# Patient Record
Sex: FEMALE | Race: WHITE | Employment: UNEMPLOYED | URBAN - METROPOLITAN AREA
[De-identification: names, ages, dates, MRNs, and addresses within clinical notes are randomized per-mention and may not be internally consistent; named-entity substitution may affect disease eponyms.]

---

## 2017-01-11 ENCOUNTER — TELEPHONE (OUTPATIENT)
Dept: FAMILY MEDICINE CLINIC | Age: 37
End: 2017-01-11

## 2017-02-27 ENCOUNTER — OFFICE VISIT (OUTPATIENT)
Dept: FAMILY MEDICINE CLINIC | Age: 37
End: 2017-02-27

## 2017-02-27 VITALS
WEIGHT: 178 LBS | BODY MASS INDEX: 27.94 KG/M2 | OXYGEN SATURATION: 99 % | HEIGHT: 67 IN | DIASTOLIC BLOOD PRESSURE: 62 MMHG | SYSTOLIC BLOOD PRESSURE: 104 MMHG | TEMPERATURE: 98.4 F | HEART RATE: 59 BPM

## 2017-02-27 DIAGNOSIS — E03.9 HYPOTHYROIDISM, UNSPECIFIED TYPE: ICD-10-CM

## 2017-02-27 DIAGNOSIS — E53.8 B12 DEFICIENCY: ICD-10-CM

## 2017-02-27 DIAGNOSIS — Z00.00 HEALTHY ADULT ON ROUTINE PHYSICAL EXAMINATION: Primary | ICD-10-CM

## 2017-02-27 DIAGNOSIS — E55.9 VITAMIN D DEFICIENCY: ICD-10-CM

## 2017-02-27 LAB
A/G RATIO: 1.3 (ref 1.1–2.2)
ALBUMIN SERPL-MCNC: 4 G/DL (ref 3.4–5)
ALP BLD-CCNC: 53 U/L (ref 40–129)
ALT SERPL-CCNC: 34 U/L (ref 10–40)
ANION GAP SERPL CALCULATED.3IONS-SCNC: 16 MMOL/L (ref 3–16)
AST SERPL-CCNC: 18 U/L (ref 15–37)
BASOPHILS ABSOLUTE: 0 K/UL (ref 0–0.2)
BASOPHILS RELATIVE PERCENT: 0.6 %
BILIRUB SERPL-MCNC: 0.4 MG/DL (ref 0–1)
BUN BLDV-MCNC: 10 MG/DL (ref 7–20)
CALCIUM SERPL-MCNC: 9 MG/DL (ref 8.3–10.6)
CHLORIDE BLD-SCNC: 102 MMOL/L (ref 99–110)
CHOLESTEROL, TOTAL: 171 MG/DL (ref 0–199)
CO2: 22 MMOL/L (ref 21–32)
CREAT SERPL-MCNC: 1 MG/DL (ref 0.6–1.1)
EOSINOPHILS ABSOLUTE: 0.1 K/UL (ref 0–0.6)
EOSINOPHILS RELATIVE PERCENT: 2.4 %
FOLATE: >20 NG/ML (ref 4.78–24.2)
GFR AFRICAN AMERICAN: >60
GFR NON-AFRICAN AMERICAN: >60
GLOBULIN: 3 G/DL
GLUCOSE BLD-MCNC: 88 MG/DL (ref 70–99)
HCT VFR BLD CALC: 42.2 % (ref 36–48)
HDLC SERPL-MCNC: 55 MG/DL (ref 40–60)
HEMOGLOBIN: 14.1 G/DL (ref 12–16)
LDL CHOLESTEROL CALCULATED: 98 MG/DL
LYMPHOCYTES ABSOLUTE: 1.8 K/UL (ref 1–5.1)
LYMPHOCYTES RELATIVE PERCENT: 29.9 %
MCH RBC QN AUTO: 30.4 PG (ref 26–34)
MCHC RBC AUTO-ENTMCNC: 33.4 G/DL (ref 31–36)
MCV RBC AUTO: 91.2 FL (ref 80–100)
MONOCYTES ABSOLUTE: 0.3 K/UL (ref 0–1.3)
MONOCYTES RELATIVE PERCENT: 5.5 %
NEUTROPHILS ABSOLUTE: 3.6 K/UL (ref 1.7–7.7)
NEUTROPHILS RELATIVE PERCENT: 61.6 %
PDW BLD-RTO: 12.8 % (ref 12.4–15.4)
PLATELET # BLD: 256 K/UL (ref 135–450)
PMV BLD AUTO: 9.5 FL (ref 5–10.5)
POTASSIUM SERPL-SCNC: 4.3 MMOL/L (ref 3.5–5.1)
RBC # BLD: 4.63 M/UL (ref 4–5.2)
SODIUM BLD-SCNC: 140 MMOL/L (ref 136–145)
T3 FREE: 2.9 PG/ML (ref 2.3–4.2)
T4 FREE: 1.6 NG/DL (ref 0.9–1.8)
TOTAL PROTEIN: 7 G/DL (ref 6.4–8.2)
TRIGL SERPL-MCNC: 90 MG/DL (ref 0–150)
TSH SERPL DL<=0.05 MIU/L-ACNC: 0.75 UIU/ML (ref 0.27–4.2)
VITAMIN B-12: 317 PG/ML (ref 211–911)
VITAMIN D 25-HYDROXY: 44.1 NG/ML
VLDLC SERPL CALC-MCNC: 18 MG/DL
WBC # BLD: 5.9 K/UL (ref 4–11)

## 2017-02-27 PROCEDURE — 99395 PREV VISIT EST AGE 18-39: CPT | Performed by: FAMILY MEDICINE

## 2017-02-27 PROCEDURE — 36415 COLL VENOUS BLD VENIPUNCTURE: CPT | Performed by: FAMILY MEDICINE

## 2017-02-27 RX ORDER — LEVOTHYROXINE SODIUM 0.1 MG/1
100 TABLET ORAL DAILY
Qty: 30 TABLET | Refills: 3 | Status: SHIPPED | OUTPATIENT
Start: 2017-02-27 | End: 2017-07-15 | Stop reason: SDUPTHER

## 2017-03-06 ENCOUNTER — OFFICE VISIT (OUTPATIENT)
Dept: OBGYN CLINIC | Age: 37
End: 2017-03-06

## 2017-03-06 VITALS
BODY MASS INDEX: 28.41 KG/M2 | DIASTOLIC BLOOD PRESSURE: 74 MMHG | HEART RATE: 76 BPM | HEIGHT: 67 IN | TEMPERATURE: 99.9 F | SYSTOLIC BLOOD PRESSURE: 110 MMHG | WEIGHT: 181 LBS

## 2017-03-06 DIAGNOSIS — Z12.4 PAP SMEAR FOR CERVICAL CANCER SCREENING: ICD-10-CM

## 2017-03-06 DIAGNOSIS — Z87.42 HISTORY OF VAGINITIS: ICD-10-CM

## 2017-03-06 DIAGNOSIS — B00.9 HSV-2 INFECTION: ICD-10-CM

## 2017-03-06 DIAGNOSIS — Z01.419 WOMEN'S ANNUAL ROUTINE GYNECOLOGICAL EXAMINATION: Primary | ICD-10-CM

## 2017-03-06 DIAGNOSIS — N94.10 DYSPAREUNIA, FEMALE: ICD-10-CM

## 2017-03-06 PROCEDURE — 87210 SMEAR WET MOUNT SALINE/INK: CPT | Performed by: OBSTETRICS & GYNECOLOGY

## 2017-03-06 PROCEDURE — 99395 PREV VISIT EST AGE 18-39: CPT | Performed by: OBSTETRICS & GYNECOLOGY

## 2017-03-06 ASSESSMENT — ENCOUNTER SYMPTOMS
NAUSEA: 0
DIARRHEA: 0
CONSTIPATION: 0
SHORTNESS OF BREATH: 0
ABDOMINAL PAIN: 0
VOMITING: 0

## 2017-03-07 DIAGNOSIS — N76.0 ACUTE VAGINITIS: Primary | ICD-10-CM

## 2017-03-31 ENCOUNTER — TELEPHONE (OUTPATIENT)
Dept: FAMILY MEDICINE CLINIC | Age: 37
End: 2017-03-31

## 2017-04-04 ENCOUNTER — OFFICE VISIT (OUTPATIENT)
Dept: FAMILY MEDICINE CLINIC | Age: 37
End: 2017-04-04

## 2017-04-04 VITALS
WEIGHT: 176 LBS | SYSTOLIC BLOOD PRESSURE: 124 MMHG | HEART RATE: 68 BPM | DIASTOLIC BLOOD PRESSURE: 76 MMHG | OXYGEN SATURATION: 98 % | TEMPERATURE: 98.5 F | RESPIRATION RATE: 14 BRPM | BODY MASS INDEX: 27.57 KG/M2

## 2017-04-04 DIAGNOSIS — R09.89 CHEST CONGESTION: Primary | ICD-10-CM

## 2017-04-04 PROCEDURE — 99213 OFFICE O/P EST LOW 20 MIN: CPT | Performed by: NURSE PRACTITIONER

## 2017-04-04 RX ORDER — ALBUTEROL SULFATE 90 UG/1
2 AEROSOL, METERED RESPIRATORY (INHALATION) EVERY 4 HOURS PRN
Qty: 1 INHALER | Refills: 0 | Status: SHIPPED | OUTPATIENT
Start: 2017-04-04 | End: 2017-04-18 | Stop reason: SDUPTHER

## 2017-04-04 RX ORDER — AZITHROMYCIN 250 MG/1
TABLET, FILM COATED ORAL
Qty: 1 PACKET | Refills: 0 | Status: SHIPPED | OUTPATIENT
Start: 2017-04-04 | End: 2017-04-10 | Stop reason: ALTCHOICE

## 2017-04-04 ASSESSMENT — ENCOUNTER SYMPTOMS
SORE THROAT: 0
COUGH: 0
HEARTBURN: 0
SHORTNESS OF BREATH: 1
SPUTUM PRODUCTION: 0

## 2017-04-10 ENCOUNTER — OFFICE VISIT (OUTPATIENT)
Dept: FAMILY MEDICINE CLINIC | Age: 37
End: 2017-04-10

## 2017-04-10 VITALS
TEMPERATURE: 98.4 F | WEIGHT: 178 LBS | DIASTOLIC BLOOD PRESSURE: 64 MMHG | SYSTOLIC BLOOD PRESSURE: 104 MMHG | BODY MASS INDEX: 27.88 KG/M2 | HEART RATE: 68 BPM | OXYGEN SATURATION: 98 %

## 2017-04-10 DIAGNOSIS — J30.9 ALLERGIC RHINITIS, UNSPECIFIED ALLERGIC RHINITIS TRIGGER, UNSPECIFIED RHINITIS SEASONALITY: ICD-10-CM

## 2017-04-10 DIAGNOSIS — R07.9 CHEST PAIN, UNSPECIFIED TYPE: Primary | ICD-10-CM

## 2017-04-10 PROCEDURE — 93000 ELECTROCARDIOGRAM COMPLETE: CPT | Performed by: NURSE PRACTITIONER

## 2017-04-10 PROCEDURE — 99213 OFFICE O/P EST LOW 20 MIN: CPT | Performed by: NURSE PRACTITIONER

## 2017-04-10 ASSESSMENT — ENCOUNTER SYMPTOMS
SORE THROAT: 0
WHEEZING: 0
SPUTUM PRODUCTION: 0
COUGH: 0

## 2017-04-18 DIAGNOSIS — R09.89 CHEST CONGESTION: ICD-10-CM

## 2017-07-15 DIAGNOSIS — E03.9 HYPOTHYROIDISM, UNSPECIFIED TYPE: ICD-10-CM

## 2017-07-17 RX ORDER — LEVOTHYROXINE SODIUM 0.1 MG/1
TABLET ORAL
Qty: 30 TABLET | Refills: 3 | Status: SHIPPED | OUTPATIENT
Start: 2017-07-17 | End: 2017-11-14 | Stop reason: SDUPTHER

## 2017-09-26 RX ORDER — ACYCLOVIR 800 MG/1
800 TABLET ORAL 2 TIMES DAILY
Qty: 10 TABLET | Refills: 3 | Status: SHIPPED | OUTPATIENT
Start: 2017-09-26 | End: 2017-10-01

## 2017-11-14 DIAGNOSIS — E03.9 HYPOTHYROIDISM, UNSPECIFIED TYPE: ICD-10-CM

## 2017-11-14 RX ORDER — LEVOTHYROXINE SODIUM 0.1 MG/1
TABLET ORAL
Qty: 30 TABLET | Refills: 0 | Status: SHIPPED | OUTPATIENT
Start: 2017-11-14 | End: 2017-12-17 | Stop reason: SDUPTHER

## 2018-03-15 RX ORDER — NORETHINDRONE AND ETHINYL ESTRADIOL 1 MG-35MCG
KIT ORAL
Qty: 28 TABLET | Refills: 0 | Status: SHIPPED | OUTPATIENT
Start: 2018-03-15 | End: 2018-04-12 | Stop reason: SDUPTHER

## 2018-04-12 RX ORDER — NORETHINDRONE AND ETHINYL ESTRADIOL 1 MG-35MCG
KIT ORAL
Qty: 28 TABLET | Refills: 0 | Status: SHIPPED | OUTPATIENT
Start: 2018-04-12 | End: 2018-04-25 | Stop reason: SDUPTHER

## 2018-04-17 ENCOUNTER — TELEPHONE (OUTPATIENT)
Dept: FAMILY MEDICINE CLINIC | Age: 38
End: 2018-04-17

## 2018-04-23 ENCOUNTER — OFFICE VISIT (OUTPATIENT)
Dept: OBGYN CLINIC | Age: 38
End: 2018-04-23

## 2018-04-23 ENCOUNTER — OFFICE VISIT (OUTPATIENT)
Dept: FAMILY MEDICINE CLINIC | Age: 38
End: 2018-04-23

## 2018-04-23 VITALS
SYSTOLIC BLOOD PRESSURE: 108 MMHG | BODY MASS INDEX: 25.87 KG/M2 | HEIGHT: 67 IN | DIASTOLIC BLOOD PRESSURE: 76 MMHG | HEART RATE: 74 BPM | OXYGEN SATURATION: 99 % | RESPIRATION RATE: 16 BRPM | WEIGHT: 164.8 LBS

## 2018-04-23 VITALS
BODY MASS INDEX: 25.94 KG/M2 | WEIGHT: 165.6 LBS | HEART RATE: 64 BPM | SYSTOLIC BLOOD PRESSURE: 94 MMHG | DIASTOLIC BLOOD PRESSURE: 60 MMHG | TEMPERATURE: 98.8 F

## 2018-04-23 DIAGNOSIS — Z00.00 HEALTHY ADULT ON ROUTINE PHYSICAL EXAMINATION: Primary | ICD-10-CM

## 2018-04-23 DIAGNOSIS — N63.10 BREAST MASS, RIGHT: ICD-10-CM

## 2018-04-23 DIAGNOSIS — Z01.419 WOMEN'S ANNUAL ROUTINE GYNECOLOGICAL EXAMINATION: Primary | ICD-10-CM

## 2018-04-23 DIAGNOSIS — Z12.4 PAP SMEAR FOR CERVICAL CANCER SCREENING: ICD-10-CM

## 2018-04-23 DIAGNOSIS — Z12.39 BREAST CANCER SCREENING: ICD-10-CM

## 2018-04-23 LAB
A/G RATIO: 1.6 (ref 1.1–2.2)
ALBUMIN SERPL-MCNC: 4.4 G/DL (ref 3.4–5)
ALP BLD-CCNC: 40 U/L (ref 40–129)
ALT SERPL-CCNC: 18 U/L (ref 10–40)
ANION GAP SERPL CALCULATED.3IONS-SCNC: 17 MMOL/L (ref 3–16)
AST SERPL-CCNC: 17 U/L (ref 15–37)
BILIRUB SERPL-MCNC: 0.5 MG/DL (ref 0–1)
BUN BLDV-MCNC: 9 MG/DL (ref 7–20)
CALCIUM SERPL-MCNC: 9 MG/DL (ref 8.3–10.6)
CHLORIDE BLD-SCNC: 99 MMOL/L (ref 99–110)
CHOLESTEROL, TOTAL: 181 MG/DL (ref 0–199)
CO2: 24 MMOL/L (ref 21–32)
CREAT SERPL-MCNC: 0.8 MG/DL (ref 0.6–1.1)
GFR AFRICAN AMERICAN: >60
GFR NON-AFRICAN AMERICAN: >60
GLOBULIN: 2.7 G/DL
GLUCOSE BLD-MCNC: 87 MG/DL (ref 70–99)
HDLC SERPL-MCNC: 77 MG/DL (ref 40–60)
LDL CHOLESTEROL CALCULATED: 90 MG/DL
POTASSIUM SERPL-SCNC: 4 MMOL/L (ref 3.5–5.1)
SODIUM BLD-SCNC: 140 MMOL/L (ref 136–145)
T4 FREE: 1.5 NG/DL (ref 0.9–1.8)
TOTAL PROTEIN: 7.1 G/DL (ref 6.4–8.2)
TRIGL SERPL-MCNC: 72 MG/DL (ref 0–150)
TSH SERPL DL<=0.05 MIU/L-ACNC: 2.4 UIU/ML (ref 0.27–4.2)
VLDLC SERPL CALC-MCNC: 14 MG/DL

## 2018-04-23 PROCEDURE — 99395 PREV VISIT EST AGE 18-39: CPT | Performed by: OBSTETRICS & GYNECOLOGY

## 2018-04-23 PROCEDURE — 99395 PREV VISIT EST AGE 18-39: CPT | Performed by: FAMILY MEDICINE

## 2018-04-23 PROCEDURE — 36415 COLL VENOUS BLD VENIPUNCTURE: CPT | Performed by: FAMILY MEDICINE

## 2018-04-23 ASSESSMENT — PATIENT HEALTH QUESTIONNAIRE - PHQ9
SUM OF ALL RESPONSES TO PHQ QUESTIONS 1-9: 0
SUM OF ALL RESPONSES TO PHQ9 QUESTIONS 1 & 2: 0
1. LITTLE INTEREST OR PLEASURE IN DOING THINGS: 0
2. FEELING DOWN, DEPRESSED OR HOPELESS: 0

## 2018-04-24 ENCOUNTER — TELEPHONE (OUTPATIENT)
Dept: OBGYN CLINIC | Age: 38
End: 2018-04-24

## 2018-04-24 ENCOUNTER — HOSPITAL ENCOUNTER (OUTPATIENT)
Dept: MAMMOGRAPHY | Age: 38
Discharge: HOME | End: 2018-04-24
Attending: OBSTETRICS & GYNECOLOGY | Admitting: OBSTETRICS & GYNECOLOGY

## 2018-04-24 DIAGNOSIS — Z01.419 WOMEN'S ANNUAL ROUTINE GYNECOLOGICAL EXAMINATION: ICD-10-CM

## 2018-04-24 DIAGNOSIS — R92.8 ABNORMAL MAMMOGRAM OF RIGHT BREAST: Primary | ICD-10-CM

## 2018-04-24 DIAGNOSIS — Z12.39 BREAST CANCER SCREENING: ICD-10-CM

## 2018-04-24 LAB
ESTIMATED AVERAGE GLUCOSE: 105.4 MG/DL
HBA1C MFR BLD: 5.3 %

## 2018-04-25 LAB
HPV COMMENT: NORMAL
HPV TYPE 16: NOT DETECTED
HPV TYPE 18: NOT DETECTED
HPVOH (OTHER TYPES): NOT DETECTED

## 2018-04-25 RX ORDER — NORETHINDRONE AND ETHINYL ESTRADIOL 1 MG-35MCG
KIT ORAL
Qty: 84 TABLET | Refills: 3 | Status: SHIPPED | OUTPATIENT
Start: 2018-04-25 | End: 2019-02-15 | Stop reason: ALTCHOICE

## 2018-05-11 ENCOUNTER — HOSPITAL ENCOUNTER (OUTPATIENT)
Dept: MAMMOGRAPHY | Age: 38
Discharge: HOME | End: 2018-05-11
Admitting: OBSTETRICS & GYNECOLOGY

## 2018-05-11 DIAGNOSIS — R92.8 ABNORMAL MAMMOGRAM: ICD-10-CM

## 2018-07-26 DIAGNOSIS — E03.9 HYPOTHYROIDISM, UNSPECIFIED TYPE: ICD-10-CM

## 2018-07-26 RX ORDER — LEVOTHYROXINE SODIUM 0.1 MG/1
TABLET ORAL
Qty: 30 TABLET | Refills: 0 | Status: SHIPPED | OUTPATIENT
Start: 2018-07-26 | End: 2018-09-01 | Stop reason: SDUPTHER

## 2019-01-11 ENCOUNTER — TELEPHONE (OUTPATIENT)
Dept: FAMILY MEDICINE CLINIC | Age: 39
End: 2019-01-11

## 2019-02-15 ENCOUNTER — OFFICE VISIT (OUTPATIENT)
Dept: FAMILY MEDICINE CLINIC | Age: 39
End: 2019-02-15
Payer: COMMERCIAL

## 2019-02-15 VITALS
SYSTOLIC BLOOD PRESSURE: 100 MMHG | HEIGHT: 67 IN | DIASTOLIC BLOOD PRESSURE: 68 MMHG | OXYGEN SATURATION: 98 % | HEART RATE: 74 BPM | RESPIRATION RATE: 16 BRPM | BODY MASS INDEX: 27.31 KG/M2 | WEIGHT: 174 LBS

## 2019-02-15 DIAGNOSIS — Z00.00 PHYSICAL EXAM: Primary | ICD-10-CM

## 2019-02-15 LAB
A/G RATIO: 1.7 (ref 1.1–2.2)
ALBUMIN SERPL-MCNC: 4.5 G/DL (ref 3.4–5)
ALP BLD-CCNC: 50 U/L (ref 40–129)
ALT SERPL-CCNC: 14 U/L (ref 10–40)
ANION GAP SERPL CALCULATED.3IONS-SCNC: 12 MMOL/L (ref 3–16)
AST SERPL-CCNC: 14 U/L (ref 15–37)
BASOPHILS ABSOLUTE: 0 K/UL (ref 0–0.2)
BASOPHILS RELATIVE PERCENT: 0.6 %
BILIRUB SERPL-MCNC: 0.3 MG/DL (ref 0–1)
BUN BLDV-MCNC: 16 MG/DL (ref 7–20)
CALCIUM SERPL-MCNC: 9.5 MG/DL (ref 8.3–10.6)
CHLORIDE BLD-SCNC: 102 MMOL/L (ref 99–110)
CHOLESTEROL, TOTAL: 175 MG/DL (ref 0–199)
CO2: 26 MMOL/L (ref 21–32)
CREAT SERPL-MCNC: 0.9 MG/DL (ref 0.6–1.1)
EOSINOPHILS ABSOLUTE: 0.1 K/UL (ref 0–0.6)
EOSINOPHILS RELATIVE PERCENT: 2 %
GFR AFRICAN AMERICAN: >60
GFR NON-AFRICAN AMERICAN: >60
GLOBULIN: 2.6 G/DL
GLUCOSE BLD-MCNC: 90 MG/DL (ref 70–99)
HCT VFR BLD CALC: 41.5 % (ref 36–48)
HDLC SERPL-MCNC: 81 MG/DL (ref 40–60)
HEMOGLOBIN: 14 G/DL (ref 12–16)
LDL CHOLESTEROL CALCULATED: 78 MG/DL
LYMPHOCYTES ABSOLUTE: 1.7 K/UL (ref 1–5.1)
LYMPHOCYTES RELATIVE PERCENT: 25.8 %
MCH RBC QN AUTO: 31.6 PG (ref 26–34)
MCHC RBC AUTO-ENTMCNC: 33.8 G/DL (ref 31–36)
MCV RBC AUTO: 93.7 FL (ref 80–100)
MONOCYTES ABSOLUTE: 0.4 K/UL (ref 0–1.3)
MONOCYTES RELATIVE PERCENT: 5.8 %
NEUTROPHILS ABSOLUTE: 4.2 K/UL (ref 1.7–7.7)
NEUTROPHILS RELATIVE PERCENT: 65.8 %
PDW BLD-RTO: 12.7 % (ref 12.4–15.4)
PLATELET # BLD: 283 K/UL (ref 135–450)
PMV BLD AUTO: 8.4 FL (ref 5–10.5)
POTASSIUM SERPL-SCNC: 4.6 MMOL/L (ref 3.5–5.1)
RBC # BLD: 4.42 M/UL (ref 4–5.2)
SODIUM BLD-SCNC: 140 MMOL/L (ref 136–145)
T4 FREE: 1.4 NG/DL (ref 0.9–1.8)
TOTAL PROTEIN: 7.1 G/DL (ref 6.4–8.2)
TRIGL SERPL-MCNC: 79 MG/DL (ref 0–150)
TSH SERPL DL<=0.05 MIU/L-ACNC: 2.01 UIU/ML (ref 0.27–4.2)
VLDLC SERPL CALC-MCNC: 16 MG/DL
WBC # BLD: 6.4 K/UL (ref 4–11)

## 2019-02-15 PROCEDURE — 99395 PREV VISIT EST AGE 18-39: CPT | Performed by: NURSE PRACTITIONER

## 2019-02-15 PROCEDURE — 36415 COLL VENOUS BLD VENIPUNCTURE: CPT | Performed by: NURSE PRACTITIONER

## 2019-02-15 ASSESSMENT — PATIENT HEALTH QUESTIONNAIRE - PHQ9
SUM OF ALL RESPONSES TO PHQ QUESTIONS 1-9: 0
SUM OF ALL RESPONSES TO PHQ9 QUESTIONS 1 & 2: 0
2. FEELING DOWN, DEPRESSED OR HOPELESS: 0
SUM OF ALL RESPONSES TO PHQ9 QUESTIONS 1 & 2: 0
1. LITTLE INTEREST OR PLEASURE IN DOING THINGS: 0
SUM OF ALL RESPONSES TO PHQ QUESTIONS 1-9: 0
2. FEELING DOWN, DEPRESSED OR HOPELESS: 0
1. LITTLE INTEREST OR PLEASURE IN DOING THINGS: 0
SUM OF ALL RESPONSES TO PHQ QUESTIONS 1-9: 0
SUM OF ALL RESPONSES TO PHQ QUESTIONS 1-9: 0

## 2019-02-15 ASSESSMENT — ENCOUNTER SYMPTOMS
BACK PAIN: 0
SHORTNESS OF BREATH: 0
WHEEZING: 0
SINUS PRESSURE: 0
EYE PAIN: 0
CONSTIPATION: 0
ABDOMINAL PAIN: 0
DIARRHEA: 0
SINUS PAIN: 0
SORE THROAT: 0

## 2019-02-16 LAB
ESTIMATED AVERAGE GLUCOSE: 99.7 MG/DL
HBA1C MFR BLD: 5.1 %

## 2019-02-25 ENCOUNTER — OFFICE VISIT (OUTPATIENT)
Dept: FAMILY MEDICINE CLINIC | Age: 39
End: 2019-02-25
Payer: COMMERCIAL

## 2019-02-25 VITALS
WEIGHT: 174 LBS | SYSTOLIC BLOOD PRESSURE: 118 MMHG | TEMPERATURE: 98.9 F | HEART RATE: 84 BPM | DIASTOLIC BLOOD PRESSURE: 64 MMHG | BODY MASS INDEX: 27.25 KG/M2

## 2019-02-25 DIAGNOSIS — B02.9 HERPES ZOSTER WITHOUT COMPLICATION: Primary | ICD-10-CM

## 2019-02-25 PROCEDURE — 99213 OFFICE O/P EST LOW 20 MIN: CPT | Performed by: NURSE PRACTITIONER

## 2019-02-25 RX ORDER — ACYCLOVIR 800 MG/1
800 TABLET ORAL
Qty: 35 TABLET | Refills: 0 | Status: SHIPPED | OUTPATIENT
Start: 2019-02-25 | End: 2019-03-04

## 2019-02-25 RX ORDER — GABAPENTIN 100 MG/1
100 CAPSULE ORAL 4 TIMES DAILY
Qty: 120 CAPSULE | Refills: 0 | Status: SHIPPED | OUTPATIENT
Start: 2019-02-25 | End: 2019-06-25

## 2019-02-25 ASSESSMENT — ENCOUNTER SYMPTOMS
RHINORRHEA: 0
DIARRHEA: 0
COUGH: 0
SHORTNESS OF BREATH: 0

## 2019-03-12 DIAGNOSIS — E03.9 HYPOTHYROIDISM, UNSPECIFIED TYPE: ICD-10-CM

## 2019-03-12 RX ORDER — LEVOTHYROXINE SODIUM 0.1 MG/1
TABLET ORAL
Qty: 30 TABLET | Refills: 4 | Status: SHIPPED | OUTPATIENT
Start: 2019-03-12 | End: 2019-08-16 | Stop reason: SDUPTHER

## 2019-06-25 ENCOUNTER — OFFICE VISIT (OUTPATIENT)
Dept: OBGYN CLINIC | Age: 39
End: 2019-06-25
Payer: COMMERCIAL

## 2019-06-25 VITALS
HEART RATE: 80 BPM | BODY MASS INDEX: 27.06 KG/M2 | TEMPERATURE: 97.4 F | SYSTOLIC BLOOD PRESSURE: 128 MMHG | DIASTOLIC BLOOD PRESSURE: 84 MMHG | WEIGHT: 172.8 LBS

## 2019-06-25 DIAGNOSIS — Z87.42 HISTORY OF VAGINITIS: ICD-10-CM

## 2019-06-25 DIAGNOSIS — Z86.19 HISTORY OF HPV INFECTION: ICD-10-CM

## 2019-06-25 DIAGNOSIS — N92.6 IRREGULAR MENSTRUAL BLEEDING: ICD-10-CM

## 2019-06-25 DIAGNOSIS — N90.89 VULVAR LESION: Primary | ICD-10-CM

## 2019-06-25 DIAGNOSIS — B00.9 HSV-2 INFECTION: ICD-10-CM

## 2019-06-25 PROCEDURE — 99213 OFFICE O/P EST LOW 20 MIN: CPT | Performed by: OBSTETRICS & GYNECOLOGY

## 2019-06-25 RX ORDER — FLUCONAZOLE 150 MG/1
TABLET ORAL
Qty: 2 TABLET | Refills: 1 | Status: SHIPPED | OUTPATIENT
Start: 2019-06-25 | End: 2020-02-17 | Stop reason: ALTCHOICE

## 2019-06-25 RX ORDER — ACYCLOVIR 400 MG/1
400 TABLET ORAL 2 TIMES DAILY
Qty: 180 TABLET | Refills: 3 | Status: SHIPPED | OUTPATIENT
Start: 2019-06-25 | End: 2020-02-17 | Stop reason: CLARIF

## 2019-06-25 NOTE — PROGRESS NOTES
Blood draw from R Antecubital x 1 attempt without difficulty. 1 SST tube drawn. Patient tolerated well.  Bisie Listen

## 2019-06-27 LAB
HERPES TYPE 1/2 IGM COMBINED: 0.58 IV
HERPES TYPE I/II IGG COMBINED: >22.4 IV
HSV 1 GLYCOPROTEIN G AB IGG: 39.2 IV
HSV 2 GLYCOPROTEIN G AB IGG: 1.54 IV

## 2019-06-28 LAB
FINAL REPORT: NORMAL
PRELIMINARY: NORMAL

## 2019-07-08 ASSESSMENT — ENCOUNTER SYMPTOMS
VOMITING: 0
ABDOMINAL DISTENTION: 0
CONSTIPATION: 0
DIARRHEA: 0
NAUSEA: 0
SHORTNESS OF BREATH: 0
ABDOMINAL PAIN: 0

## 2019-07-08 NOTE — PROGRESS NOTES
Subjective:      Patient ID: Avni Hancock is a 44 y.o. female. HPI  45 y/o  female presents for evaluation secondary to recent outbreak--for the past 3-4 days and irregular spotting. States last HSV outbreak was . Menses occur every 3 weeks x 7 days. Bleeding is heavy (tolerable) for 3-4 days, then experiences breakthrough spotting. Denies dysmenorrhea. Menses have been more irregular for the past 8 months since stopping OCPs (required GONZALO Rx)  Last pap smear was 18--normal pap, negative testing for high risk HPV. No recent history of abnormal pap smear. History significant for chronic vulvar/perineal irritation as well as recurrent vaginitis and vaginal infections. Infections in the past have lead to perirectal and perineal fissures. No recent issues. Has been treated with Synalar, Terazol and Miralax in the past.  Feels Terazol was most effective. Rosy Kraft History also significant for cryo treatment of genital warts. Review of Systems   Constitutional: Negative. Negative for activity change, appetite change, chills, fatigue, fever and unexpected weight change. Respiratory: Negative for shortness of breath. Cardiovascular: Negative for chest pain. Gastrointestinal: Negative for abdominal distention, abdominal pain, constipation, diarrhea, nausea and vomiting. Genitourinary: Positive for genital sores, menstrual problem and vaginal bleeding. Negative for difficulty urinating, dysuria, frequency, hematuria, pelvic pain, vaginal discharge and vaginal pain. Psychiatric/Behavioral: Negative. Objective:   Physical Exam   Constitutional: She is oriented to person, place, and time. She appears well-developed and well-nourished. No distress. HENT:   Head: Normocephalic and atraumatic. Pulmonary/Chest: Effort normal.   Genitourinary: No labial fusion. There is lesion on the right labia. There is no rash, tenderness or injury on the right labia.  There is lesion on the

## 2019-07-29 ENCOUNTER — OFFICE VISIT (OUTPATIENT)
Dept: OBGYN CLINIC | Age: 39
End: 2019-07-29
Payer: COMMERCIAL

## 2019-07-29 VITALS
HEART RATE: 90 BPM | TEMPERATURE: 98.5 F | SYSTOLIC BLOOD PRESSURE: 118 MMHG | BODY MASS INDEX: 27.12 KG/M2 | HEIGHT: 67 IN | DIASTOLIC BLOOD PRESSURE: 66 MMHG | WEIGHT: 172.8 LBS

## 2019-07-29 DIAGNOSIS — Z87.42 HISTORY OF VAGINITIS: ICD-10-CM

## 2019-07-29 DIAGNOSIS — Z86.19 HISTORY OF HPV INFECTION: ICD-10-CM

## 2019-07-29 DIAGNOSIS — E03.9 HYPOTHYROIDISM, UNSPECIFIED TYPE: ICD-10-CM

## 2019-07-29 DIAGNOSIS — Z12.4 PAP SMEAR FOR CERVICAL CANCER SCREENING: ICD-10-CM

## 2019-07-29 DIAGNOSIS — Z01.419 WOMEN'S ANNUAL ROUTINE GYNECOLOGICAL EXAMINATION: Primary | ICD-10-CM

## 2019-07-29 DIAGNOSIS — B00.9 HSV-2 INFECTION: ICD-10-CM

## 2019-07-29 DIAGNOSIS — Z11.3 SCREENING EXAMINATION FOR STD (SEXUALLY TRANSMITTED DISEASE): ICD-10-CM

## 2019-07-29 PROCEDURE — 99395 PREV VISIT EST AGE 18-39: CPT | Performed by: OBSTETRICS & GYNECOLOGY

## 2019-07-30 LAB
C TRACH DNA GENITAL QL NAA+PROBE: NEGATIVE
CANDIDA SPECIES, DNA PROBE: NORMAL
GARDNERELLA VAGINALIS, DNA PROBE: NORMAL
N. GONORRHOEAE DNA: NEGATIVE
TRICHOMONAS VAGINALIS DNA: NORMAL

## 2019-08-06 ASSESSMENT — ENCOUNTER SYMPTOMS
DIARRHEA: 0
CONSTIPATION: 0
ABDOMINAL DISTENTION: 0
VOMITING: 0
ABDOMINAL PAIN: 0
SHORTNESS OF BREATH: 0
NAUSEA: 0

## 2019-08-07 NOTE — PROGRESS NOTES
Head: Normocephalic and atraumatic. Eyes: EOM are normal.   Neck: Trachea normal. Neck supple. No thyromegaly present. Cardiovascular: Normal rate, regular rhythm, S1 normal, S2 normal and normal heart sounds. Pulmonary/Chest: Effort normal and breath sounds normal. No respiratory distress. Right breast exhibits no inverted nipple, no mass, no nipple discharge, no skin change and no tenderness. Left breast exhibits no inverted nipple, no mass, no nipple discharge, no skin change and no tenderness. No breast tenderness, discharge or bleeding. Breasts are symmetrical.   Abdominal: Soft. Normal appearance and bowel sounds are normal. She exhibits no distension and no mass. There is no tenderness. There is no rigidity, no rebound and no guarding. Genitourinary: Uterus normal. No breast tenderness, discharge or bleeding. Pelvic exam was performed with patient supine. No labial fusion. There is no rash, tenderness, lesion or injury on the right labia. There is no rash, tenderness, lesion or injury on the left labia. Uterus is not tender. Cervix exhibits no motion tenderness, no discharge and no friability. Right adnexum displays no mass, no tenderness and no fullness. Left adnexum displays no mass, no tenderness and no fullness. No erythema, tenderness or bleeding in the vagina. No signs of injury around the vagina. No vaginal discharge found. Musculoskeletal: Normal range of motion. Neurological: She is alert and oriented to person, place, and time. Skin: Skin is warm, dry and intact. No rash noted. No cyanosis or erythema. Nails show no clubbing. Psychiatric: She has a normal mood and affect. Her speech is normal and behavior is normal. Judgment and thought content normal. Cognition and memory are normal.   Nursing note and vitals reviewed. Assessment:        Diagnosis Orders   1. Women's annual routine gynecological examination  Vaginal Pathogens Probes *A    C.trachomatis N.gonorrhoeae DNA   2.

## 2020-02-17 ENCOUNTER — OFFICE VISIT (OUTPATIENT)
Dept: FAMILY MEDICINE CLINIC | Age: 40
End: 2020-02-17
Payer: COMMERCIAL

## 2020-02-17 VITALS
DIASTOLIC BLOOD PRESSURE: 62 MMHG | BODY MASS INDEX: 26.37 KG/M2 | TEMPERATURE: 98.7 F | WEIGHT: 168 LBS | SYSTOLIC BLOOD PRESSURE: 108 MMHG | HEIGHT: 67 IN | HEART RATE: 72 BPM | OXYGEN SATURATION: 99 %

## 2020-02-17 LAB
A/G RATIO: 1.8 (ref 1.1–2.2)
ALBUMIN SERPL-MCNC: 4.6 G/DL (ref 3.4–5)
ALP BLD-CCNC: 57 U/L (ref 40–129)
ALT SERPL-CCNC: 13 U/L (ref 10–40)
ANION GAP SERPL CALCULATED.3IONS-SCNC: 12 MMOL/L (ref 3–16)
AST SERPL-CCNC: 13 U/L (ref 15–37)
BASOPHILS ABSOLUTE: 0 K/UL (ref 0–0.2)
BASOPHILS RELATIVE PERCENT: 0.5 %
BILIRUB SERPL-MCNC: 0.4 MG/DL (ref 0–1)
BUN BLDV-MCNC: 13 MG/DL (ref 7–20)
CALCIUM SERPL-MCNC: 9.5 MG/DL (ref 8.3–10.6)
CHLORIDE BLD-SCNC: 105 MMOL/L (ref 99–110)
CHOLESTEROL, TOTAL: 186 MG/DL (ref 0–199)
CO2: 25 MMOL/L (ref 21–32)
CREAT SERPL-MCNC: 0.9 MG/DL (ref 0.6–1.1)
EOSINOPHILS ABSOLUTE: 0.2 K/UL (ref 0–0.6)
EOSINOPHILS RELATIVE PERCENT: 2.9 %
GFR AFRICAN AMERICAN: >60
GFR NON-AFRICAN AMERICAN: >60
GLOBULIN: 2.6 G/DL
GLUCOSE BLD-MCNC: 101 MG/DL (ref 70–99)
HCT VFR BLD CALC: 41.6 % (ref 36–48)
HDLC SERPL-MCNC: 78 MG/DL (ref 40–60)
HEMOGLOBIN: 13.9 G/DL (ref 12–16)
LDL CHOLESTEROL CALCULATED: 93 MG/DL
LYMPHOCYTES ABSOLUTE: 2 K/UL (ref 1–5.1)
LYMPHOCYTES RELATIVE PERCENT: 37.3 %
MCH RBC QN AUTO: 30.8 PG (ref 26–34)
MCHC RBC AUTO-ENTMCNC: 33.3 G/DL (ref 31–36)
MCV RBC AUTO: 92.7 FL (ref 80–100)
MONOCYTES ABSOLUTE: 0.4 K/UL (ref 0–1.3)
MONOCYTES RELATIVE PERCENT: 7.1 %
NEUTROPHILS ABSOLUTE: 2.8 K/UL (ref 1.7–7.7)
NEUTROPHILS RELATIVE PERCENT: 52.2 %
PDW BLD-RTO: 12.9 % (ref 12.4–15.4)
PLATELET # BLD: 250 K/UL (ref 135–450)
PMV BLD AUTO: 9 FL (ref 5–10.5)
POTASSIUM SERPL-SCNC: 4.3 MMOL/L (ref 3.5–5.1)
RBC # BLD: 4.49 M/UL (ref 4–5.2)
SODIUM BLD-SCNC: 142 MMOL/L (ref 136–145)
T4 FREE: 1.2 NG/DL (ref 0.9–1.8)
TOTAL PROTEIN: 7.2 G/DL (ref 6.4–8.2)
TRIGL SERPL-MCNC: 73 MG/DL (ref 0–150)
TSH SERPL DL<=0.05 MIU/L-ACNC: 4.05 UIU/ML (ref 0.27–4.2)
VLDLC SERPL CALC-MCNC: 15 MG/DL
WBC # BLD: 5.5 K/UL (ref 4–11)

## 2020-02-17 PROCEDURE — 36415 COLL VENOUS BLD VENIPUNCTURE: CPT | Performed by: NURSE PRACTITIONER

## 2020-02-17 PROCEDURE — 99395 PREV VISIT EST AGE 18-39: CPT | Performed by: NURSE PRACTITIONER

## 2020-02-17 ASSESSMENT — ENCOUNTER SYMPTOMS
WHEEZING: 0
CONSTIPATION: 0
ABDOMINAL PAIN: 0
SHORTNESS OF BREATH: 0
DIARRHEA: 0
SORE THROAT: 0
BACK PAIN: 0

## 2020-02-17 ASSESSMENT — PATIENT HEALTH QUESTIONNAIRE - PHQ9
SUM OF ALL RESPONSES TO PHQ QUESTIONS 1-9: 0
SUM OF ALL RESPONSES TO PHQ9 QUESTIONS 1 & 2: 0
2. FEELING DOWN, DEPRESSED OR HOPELESS: 0
1. LITTLE INTEREST OR PLEASURE IN DOING THINGS: 0
SUM OF ALL RESPONSES TO PHQ QUESTIONS 1-9: 0

## 2020-02-17 NOTE — PROGRESS NOTES
Constitutional:       Appearance: Normal appearance. She is well-developed. HENT:      Head: Normocephalic and atraumatic. Right Ear: Tympanic membrane and external ear normal.      Left Ear: Tympanic membrane and external ear normal.      Nose: Nose normal.      Mouth/Throat:      Pharynx: No oropharyngeal exudate or posterior oropharyngeal erythema. Eyes:      Conjunctiva/sclera: Conjunctivae normal.   Neck:      Musculoskeletal: Normal range of motion and neck supple. Cardiovascular:      Rate and Rhythm: Normal rate and regular rhythm. Heart sounds: Normal heart sounds. No murmur. Pulmonary:      Effort: Pulmonary effort is normal. No respiratory distress. Breath sounds: Normal breath sounds. No wheezing or rales. Abdominal:      General: Abdomen is flat. Bowel sounds are normal. There is no distension. Palpations: Abdomen is soft. Tenderness: There is no abdominal tenderness. There is no rebound. Musculoskeletal: Normal range of motion. Lymphadenopathy:      Cervical: No cervical adenopathy. Skin:     General: Skin is warm and dry. Neurological:      General: No focal deficit present. Mental Status: She is alert and oriented to person, place, and time. Deep Tendon Reflexes: Reflexes are normal and symmetric. Psychiatric:         Mood and Affect: Mood normal.         Behavior: Behavior normal.         Thought Content: Thought content normal.         Judgment: Judgment normal.         Assessment:  Encounter Diagnoses   Name Primary?  Physical exam Yes    Hypothyroidism, unspecified type        Plan:  1. Physical exam    - CBC Auto Differential  - Comprehensive Metabolic Panel  - Lipid Panel  - TSH without Reflex  - T4, Free    2. Hypothyroidism, unspecified type    - TSH without Reflex  - T4, Free            No follow-ups on file.

## 2020-02-26 ENCOUNTER — OFFICE VISIT (OUTPATIENT)
Dept: OBGYN CLINIC | Age: 40
End: 2020-02-26
Payer: COMMERCIAL

## 2020-02-26 VITALS
WEIGHT: 173.6 LBS | SYSTOLIC BLOOD PRESSURE: 110 MMHG | BODY MASS INDEX: 27.19 KG/M2 | DIASTOLIC BLOOD PRESSURE: 66 MMHG | HEART RATE: 77 BPM | TEMPERATURE: 98.1 F

## 2020-02-26 PROCEDURE — 76856 US EXAM PELVIC COMPLETE: CPT | Performed by: OBSTETRICS & GYNECOLOGY

## 2020-02-26 PROCEDURE — 99213 OFFICE O/P EST LOW 20 MIN: CPT | Performed by: OBSTETRICS & GYNECOLOGY

## 2020-02-26 RX ORDER — MEDROXYPROGESTERONE ACETATE 5 MG/1
5 TABLET ORAL DAILY
Qty: 30 TABLET | Refills: 3 | Status: SHIPPED | OUTPATIENT
Start: 2020-02-26 | End: 2020-10-20

## 2020-02-26 ASSESSMENT — ENCOUNTER SYMPTOMS
ABDOMINAL PAIN: 0
CONSTIPATION: 0
ABDOMINAL DISTENTION: 0
SHORTNESS OF BREATH: 0
NAUSEA: 0
VOMITING: 0
DIARRHEA: 0

## 2020-02-27 NOTE — PROGRESS NOTES
Subjective:      Patient ID: Nawaf Ruelas is a 44 y.o. female. HPI  45 y/o  female presents for evaluation secondary to irregular intermenstrual bleeding. Patient has noted spotting between menses for the past 1-2 years. Menses have become more frequent. Spotting has become constant. Denies pelvic cramping and pain. Denies vaginal discharge. FDLMP: yesterday. Menses occur every 3 weeks x 7 days. Bleeding is heavy x 3-4 days followed by spotting. Denies dysmenorrhea. Last pap smear was 19--normal pap smear. Tested negative for high risk HPV in 2018. No recent history of abnormal pap smear. Patient seen on 19 secondary to HSV outbreak--prior HSV outbreak was . Menses have been more regular in that past when on OCPs (required GONZALO Rx)  History significant for chronic vulvar/perineal irritation as well as recurrent vaginitis and vaginal infections. Infections in the past have lead to perirectal and perineal fissures. No recent issues. Has been treated with Synalar, Terazol and Miralax in the past.  History also significant for cryo treatment of genital warts. Notes normal thyroid testing last week. Review of Systems   Constitutional: Negative. Negative for activity change, appetite change, chills, fatigue, fever and unexpected weight change. Respiratory: Negative for shortness of breath. Cardiovascular: Negative for chest pain. Gastrointestinal: Negative for abdominal distention, abdominal pain, constipation, diarrhea, nausea and vomiting. Genitourinary: Positive for menstrual problem and vaginal bleeding. Negative for difficulty urinating, dysuria, hematuria, pelvic pain, vaginal discharge and vaginal pain. Psychiatric/Behavioral: Negative. Objective:   Physical Exam  Vitals signs and nursing note reviewed. Exam conducted with a chaperone present. Constitutional:       General: She is not in acute distress. Appearance: She is well-developed. She is not diaphoretic. Pulmonary:      Effort: Pulmonary effort is normal.   Abdominal:      General: Abdomen is flat. There is no distension. Palpations: Abdomen is soft. Tenderness: There is no abdominal tenderness. There is no guarding. Genitourinary:     General: Normal vulva. Labia:         Right: No rash, tenderness, lesion or injury. Left: No rash, tenderness, lesion or injury. Vagina: No signs of injury and foreign body. Bleeding present. No vaginal discharge, erythema, tenderness or lesions. Cervix: No cervical motion tenderness, discharge, friability, lesion or erythema. Uterus: Not tender. Skin:     General: Skin is warm and dry. Neurological:      Mental Status: She is alert and oriented to person, place, and time. Psychiatric:         Behavior: Behavior normal.         Thought Content: Thought content normal.         Judgment: Judgment normal.       PELVIC ULTRASOUND with DOPPLER INTERROGATION   NON OB    DATE: 02/26/2020    PHYSICIAN: BAHMAN Choe D.O.     SONOGRAPHER: [facility]    INDICATION: abnormal uterine bleeding    TYPE OF SCAN: vaginal, abdominal    FINDINGS:    The cul de sac is normal. No free fluid appreciated. The cervix is normal and not enlarged. Nabothian cyst/s is not noted within the uterine cervix. The uterus measures 4.87 cm x 3.69 cm x 2.79 cm. The uterus is anteverted. The endometrium measures 0.37 mm. The myometrium is homogeneous in appearance. No uterine anomalies are noted. The right ovary is present and normal.  The right ovary measures 2.18 cm x 1.78 cm x 1.09 cm. Ovary findings: No masses seen. The right adnexa is normal.  The left ovary is present and normal.  The left ovary measures 1.62 cm x 1.04 cm x 1.23 cm. Ovary findings: No masses seen. The left adnexa is normal.  Doppler interrogation demonstrates normal blood flow to the left ovary. IMPRESSION: Normal uterus. Normal right ovary.  Normal left ovary. Normal blood flow seen to right and left ovary. Imaging is limited secondary to bowel gas. The patient is well aware of the limitations of ultrasound in the detection of anomalies of the abdomen and pelvis. Assessment:       Diagnosis Orders   1. Irregular intermenstrual bleeding     2. Hypothyroidism, unspecified type     3. HSV-2 infection     4. History of HPV infection             Plan:       Ultrasound result reviewed--reassurance  Options for treatment of irregular bleeding reviewed: surveillance, OCPs (Brevicon 1/35), provera, etc.  Rx  Trial provera 5 mg daily x 21-24 days. Menstrual diary   Follow up prn and 1 year.           Zuri Choe DO

## 2020-07-23 ENCOUNTER — TELEPHONE (OUTPATIENT)
Dept: FAMILY MEDICINE CLINIC | Age: 40
End: 2020-07-23

## 2020-07-23 NOTE — TELEPHONE ENCOUNTER
----- Message from Saleem Emerson sent at 7/22/2020  6:07 PM EDT -----  Subject: Appointment Request    QUESTIONS  Reason for appointment request? No appointments available during search  ---------------------------------------------------------------------------  --------------  [address]  What is the best way for the office to contact you? OK to leave message on   voicemail  Preferred Call Back Phone Number? [phone]  ---------------------------------------------------------------------------  --------------  SCRIPT ANSWERS  Relationship to Patient? Self  Appointment reason? Symptomatic  Select script based on patient symptoms? Adult Chest Pain [Angina   MI   Heart Attack]  Are you currently having chest pain? No  Has your pain started or worsened within the past 3 days? No  Are you having difficulty breathing? No  (Is the patient requesting to be seen urgently for their symptoms?)? No  Have you previously seen a provider for this pain? No  Have you been diagnosed with   tested for   or told that you are suspected of having COVID-19 (Coronavirus)? No  Have you had a fever or taken medication to treat a fever within the past   3 days? No  Have you had a cough   shortness of breath or flu-like symptoms within the past 3 days? No  Do you currently have flu-like symptoms including fever or chills   cough   shortness of breath   or difficulty breathing   or new loss of taste or smell? No  (Service Expert  click yes below to proceed with [employer] As Usual   Scheduling)?  Yes

## 2020-07-23 NOTE — TELEPHONE ENCOUNTER
[facility] received a call from:    Name of Caller: Maria Teresa Garcia    Relationship to patient:self    Organization name: n/a    Best contact number: [phone]    Reason for call: Patient called in per missed call, Would like a call back so she can get scheduled.

## 2020-07-29 ENCOUNTER — VIRTUAL VISIT (OUTPATIENT)
Dept: FAMILY MEDICINE CLINIC | Age: 40
End: 2020-07-29
Payer: COMMERCIAL

## 2020-07-29 PROCEDURE — 99213 OFFICE O/P EST LOW 20 MIN: CPT | Performed by: FAMILY MEDICINE

## 2020-07-29 NOTE — PROGRESS NOTES
2020    TELEHEALTH EVALUATION -- Audio/Visual (During ZNKJS-57 public health emergency)    HPI:    Wero Lockwood (:  1980) has requested an audio/video evaluation for the following concern(s):    Patient had a night of extremely heavy drinking and she was super \"hung over\" the day after. She noted in a couple of days she had chest pressure that lasted for 4-5. She felt fine otherwise. She experienced no shortness of breath or cough. No problems with swallowing or heartburn. Patient notes no recurrence of this. Review of Systems    Prior to Visit Medications    Medication Sig Taking?  Authorizing Provider   medroxyPROGESTERone (PROVERA) 5 MG tablet Take 1 tablet by mouth daily Yes Jevon Choe DO   levothyroxine (SYNTHROID) 100 MCG tablet TAKE 1 TABLET BY MOUTH ONE TIME A DAY  Yes Keiko Fields MD       Social History     Tobacco Use    Smoking status: Never Smoker    Smokeless tobacco: Never Used   Substance Use Topics    Alcohol use: Yes     Comment: OCC WINE or beer     Drug use: No        Allergies   Allergen Reactions    Citalopram      Night terrors   ,   Past Medical History:   Diagnosis Date    Abnormal Pap smear of cervix     Anxiety     Breast disorder     Depression 2016    MIKE (generalized anxiety disorder)     HSV-2 infection     Hypothyroidism     Vitamin B12 deficiency    ,   Past Surgical History:   Procedure Laterality Date    WISDOM TOOTH EXTRACTION     ,   Social History     Tobacco Use    Smoking status: Never Smoker    Smokeless tobacco: Never Used   Substance Use Topics    Alcohol use: Yes     Comment: OCC WINE or beer     Drug use: No       PHYSICAL EXAMINATION:      Constitutional: [x] Appears well-developed and well-nourished [x] No apparent distress      [] Abnormal-   Mental status  [x] Alert and awake  [x] Oriented to person/place/time []Able to follow commands      Eyes:  EOM    [x]  Normal  [] Abnormal-  Sclera  [x]  Normal  [] Abnormal -         Discharge [x]  None visible  [] Abnormal -    HENT:   [x] Normocephalic, atraumatic. [] Abnormal   [x] Mouth/Throat: Mucous membranes are moist.     External Ears [x] Normal  [] Abnormal-     Neck: [x] No visualized mass     Pulmonary/Chest: [x] Respiratory effort normal.  [] No visualized signs of difficulty breathing or respiratory distress        [] Abnormal-      Musculoskeletal:   [x] Normal gait with no signs of ataxia         [x] Normal range of motion of neck        [] Abnormal-       Neurological:        [x] No Facial Asymmetry (Cranial nerve 7 motor function) (limited exam to video visit)          [x] No gaze palsy        [] Abnormal-         Skin:        [x] No significant exanthematous lesions or discoloration noted on facial skin         [] Abnormal-            Psychiatric:       [x] Normal Affect [x] No Hallucinations        [] Abnormal-     Other pertinent observable physical exam findings-     ASSESSMENT/PLAN:  1. Esophagitis  Alcohol related, counseled on avoiding over indulgence. No follow-ups on file. Melida Funes is a 36 y.o. female being evaluated by a Virtual Visit (video visit) encounter to address concerns as mentioned above. A caregiver was present when appropriate. Due to this being a TeleHealth encounter (During [place] [id number] public health emergency), evaluation of the following organ systems was limited: Vitals/Constitutional/EENT/Resp/CV/GI//MS/Neuro/Skin/Heme-Lymph-Imm. Pursuant to the emergency declaration under the [address] [place] [address], [address] [place] [address] authority and the [employer] Act, this Virtual Visit was conducted with patient's (and/or legal guardian's) consent, to reduce the patient's risk of exposure to COVID-19 and provide necessary medical care.   The patient (and/or legal guardian) has also been advised to contact this office for worsening conditions or problems, and seek emergency medical treatment and/or call 911 if deemed necessary. Patient identification was verified at the start of the visit: Yes    Total time spent on this encounter: Not billed by time    Services were provided through a video synchronous discussion virtually to substitute for in-person clinic visit. Patient and provider were located at their individual homes. --Cresencio Coffman MD on 7/29/2020 at 1:05 PM    An electronic signature was used to authenticate this note.

## 2020-07-31 ENCOUNTER — TELEPHONE (OUTPATIENT)
Dept: OBGYN CLINIC | Age: 40
End: 2020-07-31

## 2020-07-31 NOTE — TELEPHONE ENCOUNTER
Patient is calling because she's been having some irregularity with her periods. She stated that last month (June) she has period that lasted around 10 days however in July she has not had a period at all. She took a pregnancy test which came back negative. She has also noticed changes with her body temp through out the day. At time she will be cold then suddenly get very warm. She has taken provera in the past and wanted to see if Dr. Kristy Glasgow wanted her to try that again or would she have another recommendation.

## 2020-08-08 NOTE — TELEPHONE ENCOUNTER
Attempted to call patient to discuss. Patient may want to consider monthly dosing of provera or trial of a birth control pill. As long as she experiences a menstrual cycle every 3 months though she does not need to take anything (this may not be preferred if menses are too erratic, prolonged or heavy)  She may also want to consider such interventions as an IUD. The option she chooses should ultimately be based first on whether she is interested in pregnancy now or in the future. The options mentioned thus far--cyclical provera, birth control pills/patch or ring, IUD allow for her to pursue pregnancy in the future. If she has chosen to not pursue pregnancy, there are more options for treatment that we can discuss.

## 2020-08-10 NOTE — TELEPHONE ENCOUNTER
Pt returned call to office and states she is not wanting to pursue Pregnancy and would like to try the cyclic provera. Please sign if appropriate.

## 2020-08-13 RX ORDER — MEDROXYPROGESTERONE ACETATE 5 MG/1
TABLET ORAL
Qty: 30 TABLET | Refills: 3 | OUTPATIENT
Start: 2020-08-13

## 2020-08-13 NOTE — TELEPHONE ENCOUNTER
Patient would like to trial cyclical provera. Aware that medication will not provide contraceptive effect. Patient has sufficient supply for 5-6 months. Will call for refill when needed.

## 2020-08-28 ENCOUNTER — TELEPHONE (OUTPATIENT)
Dept: PULMONOLOGY | Age: 40
End: 2020-08-28

## 2020-08-28 ENCOUNTER — TELEMEDICINE (OUTPATIENT)
Dept: FAMILY MEDICINE CLINIC | Age: 40
End: 2020-08-28
Payer: COMMERCIAL

## 2020-08-28 PROCEDURE — 99213 OFFICE O/P EST LOW 20 MIN: CPT | Performed by: FAMILY MEDICINE

## 2020-08-28 NOTE — TELEPHONE ENCOUNTER
.Within this Telehealth Consent, the terms you and yours refer to the person using the Telehealth Service (Service), or in the case of a use of the Service by or on behalf of a minor, you and yours refer to and include (i) the parent or legal guardian who provides consent to the use of the Service by such minor or uses the Service on behalf of such minor, and (ii) the minor for whom consent is being provided or on whose behalf the Service is being utilized. When using Service, you will be consulting with your health care providers via the use of Telehealth.   Telehealth involves the delivery of healthcare services using electronic communications, information technology or other means between a healthcare provider and a patient who are not in the same physical location. Telehealth may be used for diagnosis, treatment, follow-up and/or patient education, and may include, but is not limited to, one or more of the following:    Electronic transmission of medical records, photo images, personal health information or other data between a patient and a healthcare provider    Interactions between a patient and healthcare provider via audio, video and/or data communications    Use of output data from medical devices, sound and video files    Anticipated Benefits   The use of Telehealth by your Provider(s) through the Service may have the following possible benefits:    Making it easier and more efficient for you to access medical care and treatment for the conditions treated by such Provider(s) utilizing the Service    Allowing you to obtain medical care and treatment by Provider(s) at times that are convenient for you    Enabling you to interact with Provider(s) without the necessity of an in-office appointment     Possible Risks   While the use of Telehealth can provide potential benefits for you, there are also potential risks associated with the use of Telehealth.  These risks include, but may not be

## 2020-08-28 NOTE — PROGRESS NOTES
2020    TELEHEALTH EVALUATION -- Audio/Visual (During RMNCR-39 public health emergency)    HPI:    Laurent Hernandez (:  1980) has requested an audio/video evaluation for the following concern(s):    Chest tightness turned into a pain. It felt like lung inflamed. Did have a tiny bit of phlegm, and when she expectorated she felt better. No inhaler. Was started on steroid by urgent care. Patient has been using stronger disinfectants in the house. Feels that these have been causing some of her breathing difficulties. Review of Systems    Prior to Visit Medications    Medication Sig Taking?  Authorizing Provider   medroxyPROGESTERone (PROVERA) 5 MG tablet Take 1 tablet by mouth daily  Zeina Or DO Дмитрий   levothyroxine (SYNTHROID) 100 MCG tablet TAKE 1 TABLET BY MOUTH ONE TIME A DAY   Ruchi Fletcher MD       Social History     Tobacco Use    Smoking status: Never Smoker    Smokeless tobacco: Never Used   Substance Use Topics    Alcohol use: Yes     Comment: OCC WINE or beer     Drug use: No        Allergies   Allergen Reactions    Citalopram      Night terrors   ,   Past Medical History:   Diagnosis Date    Abnormal Pap smear of cervix     Anxiety     Breast disorder     Depression 2016    MIKE (generalized anxiety disorder)     HSV-2 infection     Hypothyroidism     Vitamin B12 deficiency    ,   Past Surgical History:   Procedure Laterality Date    WISDOM TOOTH EXTRACTION     ,   Social History     Tobacco Use    Smoking status: Never Smoker    Smokeless tobacco: Never Used   Substance Use Topics    Alcohol use: Yes     Comment: OCC WINE or beer     Drug use: No       PHYSICAL EXAMINATION:    Constitutional: [x] Appears well-developed and well-nourished [x] No apparent distress      [] Abnormal-   Mental status  [x] Alert and awake  [x] Oriented to person/place/time []Able to follow commands      Eyes:  EOM    [x]  Normal  [] Abnormal-  Sclera  [x]  Normal  [] Abnormal - Discharge [x]  None visible  [] Abnormal -    HENT:   [x] Normocephalic, atraumatic. [] Abnormal   [x] Mouth/Throat: Mucous membranes are moist.     External Ears [x] Normal  [] Abnormal-     Neck: [x] No visualized mass     Pulmonary/Chest: [x] Respiratory effort normal.  [x] No visualized signs of difficulty breathing or respiratory distress        [] Abnormal-      Musculoskeletal:   [x] Normal gait with no signs of ataxia         [x] Normal range of motion of neck        [] Abnormal-       Neurological:        [x] No Facial Asymmetry (Cranial nerve 7 motor function) (limited exam to video visit)          [] No gaze palsy        [] Abnormal-         Skin:        [x] No significant exanthematous lesions or discoloration noted on facial skin         [] Abnormal-            Psychiatric:       [] Normal Affect [] No Hallucinations        [] Abnormal-     Other pertinent observable physical exam findings-     ASSESSMENT/PLAN:  1. Bronchospasm  Asthma vs. Chemical irritation. Will refer to pulmonary for further evaluation.    - Gallo Allred MD, Pulmonary, [facility]      No follow-ups on file. Barry Hart is a 36 y.o. female being evaluated by a Virtual Visit (video visit) encounter to address concerns as mentioned above. A caregiver was present when appropriate. Due to this being a TeleHealth encounter (During [place] [address] public [facility] emergency), evaluation of the following organ systems was limited: Vitals/Constitutional/EENT/Resp/CV/GI//MS/Neuro/Skin/Heme-Lymph-Imm. Pursuant to the emergency declaration under the [address] [place] [address] authority and the [employer] Act, this Virtual Visit was conducted with patient's (and/or legal guardian's) consent, to reduce the patient's risk of exposure to COVID-19 and provide necessary medical care.   The patient (and/or legal guardian) has also been advised to contact this office for worsening conditions or problems, and seek emergency medical treatment and/or call 911 if deemed necessary. Patient identification was verified at the start of the visit: Yes    Total time spent on this encounter: Not billed by time    Services were provided through a video synchronous discussion virtually to substitute for in-person clinic visit. Patient and provider were located at their individual homes. --Manuel Alexander MD on 8/28/2020 at 9:37 AM    An electronic signature was used to authenticate this note.

## 2020-09-09 RX ORDER — LEVOTHYROXINE SODIUM 0.1 MG/1
TABLET ORAL
Qty: 30 TABLET | Refills: 0 | Status: SHIPPED | OUTPATIENT
Start: 2020-09-09 | End: 2020-10-12 | Stop reason: SDUPTHER

## 2020-09-10 ENCOUNTER — VIRTUAL VISIT (OUTPATIENT)
Dept: PULMONOLOGY | Age: 40
End: 2020-09-10
Payer: COMMERCIAL

## 2020-09-10 PROBLEM — J98.01 ACUTE BRONCHOSPASM: Status: ACTIVE | Noted: 2020-09-10

## 2020-09-10 PROBLEM — Z77.098 EXPOSURE TO CHEMICAL INHALATION: Status: ACTIVE | Noted: 2020-09-10

## 2020-09-10 PROCEDURE — 99203 OFFICE O/P NEW LOW 30 MIN: CPT | Performed by: INTERNAL MEDICINE

## 2020-09-10 RX ORDER — ALBUTEROL SULFATE 90 UG/1
2 AEROSOL, METERED RESPIRATORY (INHALATION) EVERY 6 HOURS PRN
Qty: 1 INHALER | Refills: 0 | Status: SHIPPED | OUTPATIENT
Start: 2020-09-10

## 2020-09-10 RX ORDER — PREDNISONE 20 MG/1
20 TABLET ORAL DAILY
Qty: 10 TABLET | Refills: 0 | Status: SHIPPED | OUTPATIENT
Start: 2020-09-10 | End: 2020-09-20

## 2020-09-10 NOTE — PROGRESS NOTES
Chief Complaint/Referring Provider:  Patient is being seen at the request of Baron Almodovar for a consultation for acute bronchospasm     Presenting HPI: Patient is a 70-year-old female who was referred to the office for pulmonary evaluation because of bronchospasm, patient states that she has unexplainable chest pressure since mid July we have significant for 6 days in July and patient had gone to the urgent care where she was given a course of Medrol Dosepak with improvement but patient's symptoms have recurred and had persistent since August, patient states on questioning that she has used a new  at home which has ammonium chloride and patient states that she is trying to deep clean her house and last time she was able to ventilate after the cleaning but this month in August she was not able to do so but patient states that her symptoms were about a 1 week after she did the deep cleaning with that , patient states that along with the chest pressure she feels that she has some shortness of breath like picture, patient has occasional headaches, patient also has some left-sided throat pain has it but gland is inflamed but she does not have any swollen glands per se, patient does not have any chest congestion, patient was not able to expectorate anything significant, patient states that she tried Mucinex for 4 days without any improvement, patient does have some postnasal drainage, patient does not have any sore throat per se no difficulty in swallowing, no coughing or choking while eating, no odynophagia or dysphagia, patient does not have any significant heartburns, patient has occasional hoarse voice along with extra dry cough, patient states that she had a COVID-19 testing does which was negative, patient does not have any otalgia no ear discharge, patient states that when she walks around outside she feels better, patient also states that she opens her sliding sometimes at nighttime with improvement, patient does not have any altered bowel habits, patient does not have any swelling of the lower extremities, patient has issues with her menstrual cycle and for which she is being treated with Provera, patient does not have any history of snoring or fatigability, patient states that she does not have any other change of ambient environment, patient does not have any mold or any sick contacts, patient used to be a  but she is not working at this time because of the pandemic, patient does not have any pets, no other pertinent review of system of concern  Patient does have history of allergies and asthma in the family  Patient is a non-smoker     Past Medical History:   Diagnosis Date    Abnormal Pap smear of cervix     Anxiety     Breast disorder     Depression 6/24/2016    MIKE (generalized anxiety disorder)     HSV-2 infection     Hypothyroidism     Vitamin B12 deficiency        Past Surgical History:   Procedure Laterality Date    WISDOM TOOTH EXTRACTION         Allergies   Allergen Reactions    Citalopram      Night terrors       Medication list was reviewed and updated as needed in [facility]    Social History     Socioeconomic History    Marital status: Single     Spouse name: Not on file    Number of children: Not on file    Years of education: Not on file    Highest education level: Not on file   Occupational History    Occupation:    Social Needs    Financial resource strain: Not on file    Food insecurity     Worry: Not on file     Inability: Not on file   [employer] needs     Medical: Not on file     Non-medical: Not on file   Tobacco Use    Smoking status: Never Smoker    Smokeless tobacco: Never Used   Substance and Sexual Activity    Alcohol use: Yes     Comment: OCC WINE or beer     Drug use: No    Sexual activity: Yes     Partners: Male     Comment: boyfriend   Lifestyle    Physical activity     Days per week: Not on file     Minutes per session: Not on file    mood and affect. Behavior is normal.  No anxiety. Neurologic: Alert, awake and oriented. PERRL. Speech fluent  (deferred)      Data:     Imaging:  I have reviewed radiology images personally. XR CHEST STANDARD (2 VW)    (Results Pending)     No results found. Assessment:    1. Acute bronchospasm    - XR CHEST STANDARD (2 VW); Future  - albuterol sulfate HFA (PROAIR HFA) 108 (90 Base) MCG/ACT inhaler; Inhale 2 puffs into the lungs every 6 hours as needed for Wheezing  Dispense: 1 Inhaler; Refill: 0  - predniSONE (DELTASONE) 20 MG tablet; Take 1 tablet by mouth daily for 10 days  Dispense: 10 tablet; Refill: 0    2. Exposure to chemical inhalation    - XR CHEST STANDARD (2 VW); Future  - albuterol sulfate HFA (PROAIR HFA) 108 (90 Base) MCG/ACT inhaler; Inhale 2 puffs into the lungs every 6 hours as needed for Wheezing  Dispense: 1 Inhaler; Refill: 0  - predniSONE (DELTASONE) 20 MG tablet; Take 1 tablet by mouth daily for 10 days  Dispense: 10 tablet; Refill: 0        Plan:   · Patient's Review of system were discussed  · Patient's timeline in terms of symptoms discussed  · Patient was told about the pathophysiology of the disease process and its modifying factors  · Patient needs to have a baseline x-ray chest which is being ordered  · Patient was told to try some albuterol inhaler 2 puffs every 6 hours whenever necessary basis  · Patient was taught how to take the inhaler properly and patient exhibits understanding  · Patient to take prednisone 20 mg once a day in the morning for 10 days  · Patient was told that if she is not clinically better than she needs further evaluation including a PFT and probably more imaging  · Further management depending on patient's clinical status and the follow-up on above recommendations          Aspen Can is a 36 y.o. female being evaluated by a Virtual Visit (video visit) encounter to address concerns as mentioned above. A caregiver was present when appropriate.  Due to Strong peripheral pulses

## 2020-09-10 NOTE — PROGRESS NOTES
MA Communication:   The following orders are received by verbal communication from Hinton Call, MD    Orders include: Chest X- Ray      Prednisone       FU 4 weeks VV

## 2020-09-10 NOTE — PATIENT INSTRUCTIONS
Remember to bring all pulmonary medications to your next appointment with the office. Please keep all of your future appointments scheduled by [facility] Pul[facility] office. Out of respect for other patients and providers, you may be asked to reschedule your appointment if you arrive later than your scheduled appointment time. Appointments cancelled less than 24hrs in advance will be considered a no show. Patients with three missed appointments within 1 year or four missed appointments within 2 years can be dismissed from the practice. You may receive a survey regarding the care you received during your visit. Your input is valuable to us. We encourage you to complete and return your survey. We hope you will choose us in the future for your healthcare needs.

## 2020-09-14 ENCOUNTER — HOSPITAL ENCOUNTER (OUTPATIENT)
Dept: GENERAL RADIOLOGY | Age: 40
Discharge: HOME | End: 2020-09-14
Payer: COMMERCIAL

## 2020-09-14 PROCEDURE — 71046 X-RAY EXAM CHEST 2 VIEWS: CPT

## 2020-09-21 ENCOUNTER — TELEPHONE (OUTPATIENT)
Dept: PULMONOLOGY | Age: 40
End: 2020-09-21

## 2020-09-21 NOTE — TELEPHONE ENCOUNTER
Told chest x-ray was normal, wants to know if symptoms could be hormone related or allergies related.  Please advise

## 2020-09-21 NOTE — TELEPHONE ENCOUNTER
Pt called back. She states she is doing better. She said she will keep track of her symptoms and discuss it with him at her next appt.

## 2020-10-08 ENCOUNTER — TELEPHONE (OUTPATIENT)
Dept: PULMONOLOGY | Age: 40
End: 2020-10-08

## 2020-10-08 NOTE — TELEPHONE ENCOUNTER
Pt said after she took prednisone the tightness and heaviness in her chest went away for a couple of weeks. She thought it may have been due to a  she was using. However she started again a couple days ago with the heaviness and tightness. The only other thing that was consistent both this is that she is on her menstrual cycle and she wants to know if that could be contributing. She does not know if she needs to go on prednisone again or does she also need to discuss with GYN. Please advise.     Pharmacy:  [facility]

## 2020-10-09 ENCOUNTER — TELEPHONE (OUTPATIENT)
Dept: OBGYN CLINIC | Age: 40
End: 2020-10-09

## 2020-10-09 NOTE — TELEPHONE ENCOUNTER
People can have asthma with hormonal changes during menstrual cycle -ask options with Ob/Gyn and if not improving -can discuss on next visit

## 2020-10-09 NOTE — TELEPHONE ENCOUNTER
Patient has done the 1 35mg OCP in the past.  She is okay to do whatever pill Dr. Nancy Pace would like her to do. The one that worked best for her is no longer on the market.     Routing to Dr. Nancy Pace

## 2020-10-09 NOTE — TELEPHONE ENCOUNTER
Pt called back and is asking if you can put her on Prednisone for another 10 days to help with her SOB.     Teresita Padilla EG

## 2020-10-09 NOTE — TELEPHONE ENCOUNTER
Patient has been having a lot of period issues with her period over the last 2 years. She has not been taking the progesterone the 5 days before she should expect her periods, they have been very irregular. Patient is having chest issues like a heaviness and tightness when her period is coming on. She has ruled out COVID. This has been happening when she should be starting since July. Is there a possibility she is having early menopause? She is seeing a pulmonologist as well. She would like Dr. Marisel Alejandre opinion is the progesterone the best, should she do OCP again, however she did not have much luck on it either. Routing to Dr. Latanya Barnett.

## 2020-10-09 NOTE — TELEPHONE ENCOUNTER
Rx phoned into [facility]. Pt informed. Will keep appt with Dr. Toshia Garza this month to discuss further.

## 2020-10-09 NOTE — TELEPHONE ENCOUNTER
Due to the irregularity of her periods, patient may want to try 3-6 months of the OCP instead of the progesterone. Menopause is not likely if she is having monthly periods. Patient described mid cycle bleeding in the past.  Has this changed? Thanks.

## 2020-10-12 ENCOUNTER — TELEPHONE (OUTPATIENT)
Dept: FAMILY MEDICINE CLINIC | Age: 40
End: 2020-10-12

## 2020-10-12 RX ORDER — LEVOTHYROXINE SODIUM 0.1 MG/1
TABLET ORAL
Qty: 30 TABLET | Refills: 5 | Status: SHIPPED | OUTPATIENT
Start: 2020-10-12 | End: 2021-04-09

## 2020-10-13 NOTE — TELEPHONE ENCOUNTER
Please check on pattern of patient's bleeding. Mid cycle bleeding--is it still present? If patient is having any cardiac or pulmonary issues we may want to avoid combined birth control. Please see pulmonary has found a reason for her chest tightness. Ok to make appointment to further discuss current symptoms and options for treatment. Thanks.

## 2020-10-14 NOTE — TELEPHONE ENCOUNTER
Bleeding every 2.5 to 3 weeks, has been on prednisone a few times. Still having some lung issues coming worse around the time she is starting; she is having tightness in chest, not chest pain. She has an appointment on 10/20/20 with pulmonology to discuss possible adult onset asthma. Would Dr. Ernesto Vizcaino be willing to start medication or, needing to see her in office? Routing to Dr. Ernesto Vizcaino.

## 2020-10-14 NOTE — TELEPHONE ENCOUNTER
Recommend patient be seen by pulmonary prior to starting medication. Patient's age is a risk factor for pulmonary embolism. Birth control will increase that risk as well. Would prefer she be cleared by pulmonary prior to starting OCP. Patient may want to consider coming in for appointment to review all options. Thanks.

## 2020-10-20 ENCOUNTER — VIRTUAL VISIT (OUTPATIENT)
Dept: PULMONOLOGY | Age: 40
End: 2020-10-20
Payer: COMMERCIAL

## 2020-10-20 ENCOUNTER — TELEPHONE (OUTPATIENT)
Dept: PULMONOLOGY | Age: 40
End: 2020-10-20

## 2020-10-20 PROBLEM — R06.02 SOB (SHORTNESS OF BREATH): Status: ACTIVE | Noted: 2020-10-20

## 2020-10-20 PROCEDURE — 99214 OFFICE O/P EST MOD 30 MIN: CPT | Performed by: INTERNAL MEDICINE

## 2020-10-20 NOTE — PATIENT INSTRUCTIONS
Remember to bring a list of pulmonary medications and any CPAP or BiPAP machines to your next appointment with the office. Please keep all of your future appointments scheduled by [facility] Pulmonary office. Out of respect for other patients and providers, you may be asked to reschedule your appointment if you arrive later than your scheduled appointment time. Appointments cancelled less than 24hrs in advance will be considered a no show. Patients with three missed appointments within 1 year or four missed appointments within 2 years can be dismissed from the practice. You may receive a survey regarding the care you received during your visit. Your input is valuable to us. We encourage you to complete and return your survey. We hope you will choose us in the future for your healthcare needs. Pt instructed of all future appointment dates & times, including radiology, labs, procedures & referrals. If procedures were scheduled preparation instructions provided. Instructions on future appointments with [facility] Pulmonary were given.

## 2020-10-20 NOTE — PROGRESS NOTES
MA Communication:   The following orders are received by verbal communication from Kevin James MD    Orders include:  PFT       VV FU

## 2020-10-20 NOTE — PROGRESS NOTES
Chief Complaint/Referring Provider:  Pulmonary follow up     A virtual pulmonary visit was done for the patient to discuss clinical status and follow-up on previous recommendations, patient had to be given another course of prednisone because of her symptoms, patient states that now also her symptoms come and go, patient still has intermittent chest tightness, patient has slight hoarseness of the voice, patient has occasional nasal congestion, patient had concerns about relationship with being her bronchospasm and menstrual cycle, patient was told by her gynecologist to use progesterone which she has not started so far, patient states that after she took 6 days of prednisone she stopped it because she was not having any beneficial effects, patient states that initially when she was having more symptoms she was using her inhaler 2-3 times a day, patient states that few days back she had to take the inhaler in the middle of the night and she was able to bring up some phlegm after that patient's symptoms have improved to some extent, patient also has discontinued the  which was thought to be causing her to have issues but.   Patient symptoms have not gone away, patient also wanted know if she has any viral issues or post viral issues which are causing her to have symptoms, patient does have fatigue, patient does not give any objective data suggestive of sleep fragmentation per se, patient has not taken her flu shot so far, no other pertinent review of system of concern     Previous  HPI: Patient is a 42-year-old female who was referred to the office for pulmonary evaluation because of bronchospasm, patient states that she has unexplainable chest pressure since mid July we have significant for 6 days in July and patient had gone to the urgent care where she was given a course of Medrol Dosepak with improvement but patient's symptoms have recurred and had persistent since August, patient states on questioning that she has used a new  at home which has ammonium chloride and patient states that she is trying to deep clean her house and last time she was able to ventilate after the cleaning but this month in August she was not able to do so but patient states that her symptoms were about a 1 week after she did the deep cleaning with that , patient states that along with the chest pressure she feels that she has some shortness of breath like picture, patient has occasional headaches, patient also has some left-sided throat pain has it but gland is inflamed but she does not have any swollen glands per se, patient does not have any chest congestion, patient was not able to expectorate anything significant, patient states that she tried Mucinex for 4 days without any improvement, patient does have some postnasal drainage, patient does not have any sore throat per se no difficulty in swallowing, no coughing or choking while eating, no odynophagia or dysphagia, patient does not have any significant heartburns, patient has occasional hoarse voice along with extra dry cough, patient states that she had a COVID-19 testing does which was negative, patient does not have any otalgia no ear discharge, patient states that when she walks around outside she feels better, patient also states that she opens her sliding sometimes at nighttime with improvement, patient does not have any altered bowel habits, patient does not have any swelling of the lower extremities, patient has issues with her menstrual cycle and for which she is being treated with Provera, patient does not have any history of snoring or fatigability, patient states that she does not have any other change of ambient environment, patient does not have any mold or any sick contacts, patient used to be a  but she is not working at this time because of the pandemic, patient does not have any pets, no other pertinent review of system of concern  Patient does on file       Family History   Problem Relation Age of Onset    Depression Mother     Other Mother         thyroid removed    Hypothyroidism Sister     Alcohol Abuse Father     No Known Problems Brother     Breast Cancer Maternal Aunt     No Known Problems Paternal Grandfather     Cancer Paternal Grandmother         stomach then spread    No Known Problems Maternal Grandmother     No Known Problems Maternal Grandfather     No Known Problems Maternal Uncle             Review of Systems same as above    Physical Exam:  not currently breastfeeding.'  Constitutional:  No acute distress. HENT:  Oropharynx is clear and moist. No thyromegaly. Eyes:  Conjunctivae arenormal. Pupils equal, round, and reactive to light. No scleral icterus. Neck: . No tracheal deviation present. No obvious thyroid mass. Cardiovascular:Normal rate, regular rhythm, normal heart sounds. No right ventricular heave. Nolower extremity edema. Pulmonary/Chest: No wheezes. No rales. Chest wall is not dull to percussion. Noaccessory muscle usage or stridor. Abdominal: Soft. Bowel sounds present. No distension or hernia. Notenderness. Musculoskeletal: No cyanosis. No clubbing. No obvious joint deformity. Lymphadenopathy: No cervical or supraclavicular adenopathy. Skin: Skin is warm and dry. No rash or nodules on the exposed extremities. Psychiatric: Normal mood and affect. Behavior is normal.  No anxiety. Neurologic: Alert, awake and oriented. PERRL. Speech fluent  (deferred)      Data:     Imaging:  I have reviewed radiology images personally. No orders to display     No results found. Assessment:    1. Acute bronchospasm      2.   Shortness of breath        Plan:   · Patient's Review of system were discussed  · Patient's timeline in terms of symptoms discussed again  · Patient was told about the pathophysiology of the disease process and its modifying factors  · Patient is x-ray chest was normal   · Patient was told to try some albuterol inhaler 2 puffs every 6 hours whenever necessary basis  · Patient still required inhaler and has had 2 courses of prednisone  · Patient still has intermittent chest tightness  · Patient was told to discuss with PCP about evaluating for any CMV or EBV virus for any infectious mononucleosis which she is concerned about, patient also needs to discuss and manage her menstrual cycle as per her gynecologist  · Given the patient's clinical status it was thought that the PFT will help her to progress to prognosticate and diagnosis  · Patient was told to take a flu shot  · Further management depending on patient's clinical status and PFT result          Angelic Jane is a 36 y.o. female evaluated via telephone on 10/20/2020. Consent:  She and/or health care decision maker is aware that that she may receive a bill for this telephone service, depending on her insurance coverage, and has provided verbal consent to proceed: Yes      Documentation:  I communicated with the patient and/or health care decision maker about clinical sttaus and options   Details of this discussion including any medical advice provided:       I affirm this is a Patient Initiated Episode with a Patient who has not had a related appointment within my department in the past 7 days or scheduled within the next 24 hours.     Patient identification was verified at the start of the visit: Yes    Total Time: minutes: 21-30 minutes    Note: not billable if this call serves to triage the patient into an appointment for the relevant concern      [facility]

## 2020-10-28 ENCOUNTER — OFFICE VISIT (OUTPATIENT)
Dept: PRIMARY CARE CLINIC | Age: 40
End: 2020-10-28
Payer: COMMERCIAL

## 2020-10-28 DIAGNOSIS — Z01.818 PREOP TESTING: Primary | ICD-10-CM

## 2020-10-28 PROCEDURE — 99211 OFF/OP EST MAY X REQ PHY/QHP: CPT | Performed by: FAMILY MEDICINE

## 2020-10-28 NOTE — PATIENT INSTRUCTIONS

## 2020-10-28 NOTE — PROGRESS NOTES
Saleem Minus received a viral test for COVID-19. They were educated on isolation and quarantine as appropriate. For any symptoms, they were directed to seek care from their PCP, given contact information to establish with a doctor, directed to an urgent care or the emergency room.

## 2020-10-29 LAB — SARS-COV-2: NOT DETECTED

## 2020-10-29 NOTE — RESULT ENCOUNTER NOTE

## 2020-10-30 ENCOUNTER — TELEPHONE (OUTPATIENT)
Dept: FAMILY MEDICINE CLINIC | Age: 40
End: 2020-10-30

## 2020-11-03 ENCOUNTER — HOSPITAL ENCOUNTER (OUTPATIENT)
Dept: PULMONOLOGY | Age: 40
Discharge: HOME | End: 2020-11-03
Payer: COMMERCIAL

## 2020-11-03 VITALS — OXYGEN SATURATION: 100 %

## 2020-11-03 LAB
DLCO %PRED: 115 %
DLCO PRED: NORMAL
DLCO/VA %PRED: NORMAL
DLCO/VA PRED: NORMAL
DLCO/VA: NORMAL
DLCO: NORMAL
EXPIRATORY TIME-POST: NORMAL
EXPIRATORY TIME: NORMAL
FEF 25-75% %CHNG: NORMAL
FEF 25-75% %PRED-POST: NORMAL
FEF 25-75% %PRED-PRE: NORMAL
FEF 25-75% PRED: NORMAL
FEF 25-75%-POST: NORMAL
FEF 25-75%-PRE: NORMAL
FEV1 %PRED-POST: 88 %
FEV1 %PRED-PRE: 67 %
FEV1 PRED: NORMAL
FEV1-POST: NORMAL
FEV1-PRE: NORMAL
FEV1/FVC %PRED-POST: NORMAL
FEV1/FVC %PRED-PRE: NORMAL
FEV1/FVC PRED: NORMAL
FEV1/FVC-POST: 64 %
FEV1/FVC-PRE: 49 %
FVC %PRED-POST: 112 L
FVC %PRED-PRE: 112 %
FVC PRED: NORMAL
FVC-POST: NORMAL
FVC-PRE: NORMAL
GAW %PRED: NORMAL
GAW PRED: NORMAL
GAW: NORMAL
IC %PRED: NORMAL
IC PRED: NORMAL
IC: NORMAL
MEP: NORMAL
MIP: NORMAL
MVV %PRED-PRE: NORMAL
MVV PRED: NORMAL
MVV-PRE: NORMAL
PEF %PRED-POST: NORMAL
PEF %PRED-PRE: NORMAL
PEF PRED: NORMAL
PEF%CHNG: NORMAL
PEF-POST: NORMAL
PEF-PRE: NORMAL
RAW %PRED: NORMAL
RAW PRED: NORMAL
RAW: NORMAL
RV %PRED: NORMAL
RV PRED: NORMAL
RV: NORMAL
SVC %PRED: NORMAL
SVC PRED: NORMAL
SVC: NORMAL
TLC %PRED: 108 %
TLC PRED: NORMAL
TLC: NORMAL
VA %PRED: NORMAL
VA PRED: NORMAL
VA: NORMAL
VTG %PRED: NORMAL
VTG PRED: NORMAL
VTG: NORMAL

## 2020-11-03 PROCEDURE — 94729 DIFFUSING CAPACITY: CPT

## 2020-11-03 PROCEDURE — 94726 PLETHYSMOGRAPHY LUNG VOLUMES: CPT

## 2020-11-03 PROCEDURE — 6370000000 HC RX 637 (ALT 250 FOR IP): Performed by: INTERNAL MEDICINE

## 2020-11-03 PROCEDURE — 94060 EVALUATION OF WHEEZING: CPT

## 2020-11-03 PROCEDURE — 94760 N-INVAS EAR/PLS OXIMETRY 1: CPT

## 2020-11-03 RX ORDER — ALBUTEROL SULFATE 90 UG/1
4 AEROSOL, METERED RESPIRATORY (INHALATION) ONCE
Status: COMPLETED | OUTPATIENT
Start: 2020-11-03 | End: 2020-11-03

## 2020-11-03 RX ADMIN — Medication 4 PUFF: at 13:42

## 2020-11-03 ASSESSMENT — PULMONARY FUNCTION TESTS
FEV1/FVC_POST: 64
FVC_PERCENT_PREDICTED_PRE: 112
FEV1_PERCENT_PREDICTED_PRE: 67
FVC_PERCENT_PREDICTED_POST: 112
FEV1_PERCENT_PREDICTED_POST: 88
FEV1/FVC_PRE: 49

## 2020-11-05 ENCOUNTER — VIRTUAL VISIT (OUTPATIENT)
Dept: PULMONOLOGY | Age: 40
End: 2020-11-05
Payer: COMMERCIAL

## 2020-11-05 ENCOUNTER — TELEPHONE (OUTPATIENT)
Dept: FAMILY MEDICINE CLINIC | Age: 40
End: 2020-11-05

## 2020-11-05 PROBLEM — J45.909 MODERATE ASTHMA WITHOUT COMPLICATION: Status: ACTIVE | Noted: 2020-11-05

## 2020-11-05 PROCEDURE — 99213 OFFICE O/P EST LOW 20 MIN: CPT | Performed by: INTERNAL MEDICINE

## 2020-11-05 NOTE — TELEPHONE ENCOUNTER
Patient is requesting orders for blood work to trace if she had a viral infection recently. She is inquiring what test would detect this. Please advise.

## 2020-11-05 NOTE — PROGRESS NOTES
patient also wanted know if she has any viral issues or post viral issues which are causing her to have symptoms, patient does have fatigue, patient does not give any objective data suggestive of sleep fragmentation per se, patient has not taken her flu shot so far, no other pertinent review of system of concern     : Patient is a 51-year-old female who was referred to the office for pulmonary evaluation because of bronchospasm, patient states that she has unexplainable chest pressure since mid July we have significant for 6 days in July and patient had gone to the urgent care where she was given a course of Medrol Dosepak with improvement but patient's symptoms have recurred and had persistent since August, patient states on questioning that she has used a new  at home which has ammonium chloride and patient states that she is trying to deep clean her house and last time she was able to ventilate after the cleaning but this month in August she was not able to do so but patient states that her symptoms were about a 1 week after she did the deep cleaning with that , patient states that along with the chest pressure she feels that she has some shortness of breath like picture, patient has occasional headaches, patient also has some left-sided throat pain has it but gland is inflamed but she does not have any swollen glands per se, patient does not have any chest congestion, patient was not able to expectorate anything significant, patient states that she tried Mucinex for 4 days without any improvement, patient does have some postnasal drainage, patient does not have any sore throat per se no difficulty in swallowing, no coughing or choking while eating, no odynophagia or dysphagia, patient does not have any significant heartburns, patient has occasional hoarse voice along with extra dry cough, patient states that she had a COVID-19 testing does which was negative, patient does not have any otalgia no ear discharge, patient states that when she walks around outside she feels better, patient also states that she opens her sliding sometimes at nighttime with improvement, patient does not have any altered bowel habits, patient does not have any swelling of the lower extremities, patient has issues with her menstrual cycle and for which she is being treated with Provera, patient does not have any history of snoring or fatigability, patient states that she does not have any other change of ambient environment, patient does not have any mold or any sick contacts, patient used to be a  but she is not working at this time because of the pandemic, patient does not have any pets, no other pertinent review of system of concern  Patient does have history of allergies and asthma in the family  Patient is a non-smoker     Past Medical History:   Diagnosis Date    Abnormal Pap smear of cervix     Anxiety     Breast disorder     Depression 6/24/2016    MIKE (generalized anxiety disorder)     HSV-2 infection     Hypothyroidism     Moderate asthma without complication 82/6/8768    Vitamin B12 deficiency        Past Surgical History:   Procedure Laterality Date    WISDOM TOOTH EXTRACTION         Allergies   Allergen Reactions    Citalopram      Night terrors       Medication list was reviewed and updated as needed in Epic    Social History     Socioeconomic History    Marital status: Single     Spouse name: Not on file    Number of children: Not on file    Years of education: Not on file    Highest education level: Not on file   Occupational History    Occupation:    Social Needs    Financial resource strain: Not on file    Food insecurity     Worry: Not on file     Inability: Not on file   [employer] needs     Medical: Not on file     Non-medical: Not on file   Tobacco Use    Smoking status: Never Smoker    Smokeless tobacco: Never Used   Substance and Sexual Activity    Alcohol use: Yes     Comment: OCC WINE or beer     Drug use: No    Sexual activity: Yes     Partners: Male     Comment: boyfriend   Lifestyle    Physical activity     Days per week: Not on file     Minutes per session: Not on file    Stress: Not on file   Relationships    Social connections     Talks on phone: Not on file     Gets together: Not on file     Attends Gnosticism service: Not on file     Active member of club or organization: Not on file     Attends meetings of clubs or organizations: Not on file     Relationship status: Not on file    Intimate partner violence     Fear of current or ex partner: Not on file     Emotionally abused: Not on file     Physically abused: Not on file     Forced sexual activity: Not on file   Other Topics Concern    Not on file   Social History Narrative    Not on file       Family History   Problem Relation Age of Onset    Depression Mother     Other Mother         thyroid removed    Hypothyroidism Sister     Alcohol Abuse Father     No Known Problems Brother     Breast Cancer Maternal Aunt     No Known Problems Paternal Grandfather     Cancer Paternal Grandmother         stomach then spread    No Known Problems Maternal Grandmother     No Known Problems Maternal Grandfather     No Known Problems Maternal Uncle             Review of Systems same as above    Physical Exam:  not currently breastfeeding.'  Constitutional:  No acute distress. HENT:  Oropharynx is clear and moist. No thyromegaly. Eyes:  Conjunctivae arenormal. Pupils equal, round, and reactive to light. No scleral icterus. Neck: . No tracheal deviation present. No obvious thyroid mass. Cardiovascular:Normal rate, regular rhythm, normal heart sounds. No right ventricular heave. Nolower extremity edema. Pulmonary/Chest: No wheezes. No rales. Chest wall is not dull to percussion. Noaccessory muscle usage or stridor. Abdominal: Soft. Bowel sounds present. No distension or hernia. Notenderness. Musculoskeletal: No cyanosis. No clubbing. No obvious joint deformity. Lymphadenopathy: No cervical or supraclavicular adenopathy. Skin: Skin is warm and dry. No rash or nodules on the exposed extremities. Psychiatric: Normal mood and affect. Behavior is normal.  No anxiety. Neurologic: Alert, awake and oriented. PERRL. Speech fluent  (deferred)      Data:     Imaging:  I have reviewed radiology images personally. No orders to display       PFT INTERPRETATION     The patient is a 72-year-old female who underwent a PFT for  bronchospasm. Spirometry shows FVC to be 112%, FEV1 to be 67%, FEV1 to  FVC ratio was 59%, FEF 25%-75% was 42%. The patient had significant  postbronchodilator improvement on the study. Lung volume shows the  total lung capacity was normal.  The patient has air trapping and  hyperinflation on this study. The patient's diffusion capacity when  adjusted for volume was normal.  The patient's flow-volume loop was  suggestive of obstructive pattern.     On the basis of this PFT, the patient has moderate obstructive airway  disease. Along with that, the patient has reactive airway disease. Assessment:    1.  Moderate asthma without complications            Plan:   · Patient's Review of system were discussed  · Patient was told about the PFT results along with implications and patient has moderate asthma-like symptoms with obstructive and reactive airway disease and the implications of that discussed  · Patient does not have any increasing symptoms of concern at this time and patient is clinically better as compared to the previous time and patient requirement for albuterol in is limited but not subject the patient to a long-acting inhaler for now  · Patient was told about the pathophysiology of the disease process and its modifying factors  · Patient is x-ray chest was normal   · Patient was told to try some albuterol inhaler 2 puffs every 6 hours whenever necessary basis  · Patient was told to discuss with PCP about evaluating for any CMV or EBV virus for any infectious mononucleosis which she is concerned about  · If patient is requirement for rescue inhaler increases or patient is symptomatic then patient needs to call for follow-up  · Otherwise patient can follow-up on a as needed basis          Lacey Cisneros is a 36 y.o. female being evaluated by a Virtual Visit (video visit) encounter to address concerns as mentioned above. A caregiver was present when appropriate. Due to this being a TeleHealth encounter (During CBXAD-27 public health emergency), evaluation of the following organ systems was limited: Vitals/Constitutional/EENT/Resp/CV/GI//MS/Neuro/Skin/Heme-Lymph-Imm. Pursuant to the emergency declaration under the [address] [place] [address] authority and the [employer] and [employer] Act, this Virtual Visit was conducted with patient's (and/or legal guardian's) consent, to reduce the patient's risk of exposure to COVID-19 and provide necessary medical care. The patient (and/or legal guardian) has also been advised to contact this office for worsening conditions or problems, and seek emergency medical treatment and/or call 911 if deemed necessary. Patient identification was verified at the start of the visit: Yes    Total time spent for this encounter: Not billed by time    Services were provided through a video synchronous discussion virtually to substitute for in-person clinic visit. Patient and provider were located at their individual homes. --Brady Garcia MD on 11/5/2020 at 7:50 PM    An electronic signature was used to authenticate this note.

## 2020-11-05 NOTE — TELEPHONE ENCOUNTER
Pt would like to know what testing she can do. She has had sx off and on since July and has seen a pulmonologist that told her that she has mild asthma but she has never had this before. The pulmonologist is the one that suggested the blood work. She just wants to get to the bottom of what is going on.

## 2020-11-05 NOTE — TELEPHONE ENCOUNTER
Blood work is not sensitive for this.   Only good if you get a test when you are having symptoms, and it is not a blood test.

## 2020-11-06 NOTE — PROGRESS NOTES
MA Communication:   The following orders are received by verbal communication from Radha Rangel MD    Orders include:  PRN fu

## 2020-11-06 NOTE — PATIENT INSTRUCTIONS
Remember to bring a list of pulmonary medications and any CPAP or BiPAP machines to your next appointment with the office. Please keep all of your future appointments scheduled by [address]. Out of respect for other patients and providers, you may be asked to reschedule your appointment if you arrive later than your scheduled appointment time. Appointments cancelled less than 24hrs in advance will be considered a no show. Patients with three missed appointments within 1 year or four missed appointments within 2 years can be dismissed from the practice. You may receive a survey regarding the care you received during your visit. Your input is valuable to us. We encourage you to complete and return your survey. We hope you will choose us in the future for your healthcare needs. Pt instructed of all future appointment dates & times, including radiology, labs, procedures & referrals. If procedures were scheduled preparation instructions provided. Instructions on future appointments with [facility] were given.

## 2020-11-19 ENCOUNTER — TELEPHONE (OUTPATIENT)
Dept: PULMONOLOGY | Age: 40
End: 2020-11-19

## 2020-11-19 NOTE — TELEPHONE ENCOUNTER
Patient says that bronchodilators are not working. Patient would like to know if there is another inhaler that would work better? Also she has hoariness that she would like to treat.

## 2020-11-20 RX ORDER — BUDESONIDE AND FORMOTEROL FUMARATE DIHYDRATE 160; 4.5 UG/1; UG/1
2 AEROSOL RESPIRATORY (INHALATION) 2 TIMES DAILY
Qty: 1 INHALER | Refills: 0 | Status: SHIPPED | OUTPATIENT
Start: 2020-11-20 | End: 2021-06-23

## 2020-11-20 NOTE — TELEPHONE ENCOUNTER
Sent script for Symbicort she can try  Set her up to see Dr. Kathi Hernández or myself if she does not want to wait

## 2020-11-23 ENCOUNTER — TELEPHONE (OUTPATIENT)
Dept: PULMONOLOGY | Age: 40
End: 2020-11-23

## 2020-11-23 NOTE — TELEPHONE ENCOUNTER
Pt. Aware no urther questions understands now,did not feel she needed to make appt. Will call if she needs to make appt.

## 2020-11-25 NOTE — TELEPHONE ENCOUNTER
I called [employer] - it appears that generic Advair is a covered alternative. However, Symbicort is also formulary but has a high cost.  Will route to Dr. Lyndsay Marcelo to ask him to send generic Advair to her [employer] if appropriate. No copay cards available for either drug unfortunately.

## 2020-11-25 NOTE — TELEPHONE ENCOUNTER
Pt called today saying that the Symbicort Rx that Dr. Wilma Dela Cruz sent in today is too expensive ($361 for a 30 day supply). She wondered if flovent would be a viable option for her or any other bronchodilator that the doctor thinks is appropriate. Pt has [employer] right now which does not offer great pharmacy benefits. She uses [facility] in [facility].

## 2020-11-25 NOTE — TELEPHONE ENCOUNTER
LM for pt that generic Advair has been sent to her pharmacy.   Advised that she calls us back next week if she's not able to get this medication due to cost.

## 2020-12-04 ENCOUNTER — TELEPHONE (OUTPATIENT)
Dept: PULMONOLOGY | Age: 40
End: 2020-12-04

## 2020-12-04 RX ORDER — PREDNISONE 20 MG/1
20 TABLET ORAL 2 TIMES DAILY
Qty: 10 TABLET | Refills: 0 | Status: SHIPPED | OUTPATIENT
Start: 2020-12-04 | End: 2020-12-09

## 2020-12-04 NOTE — TELEPHONE ENCOUNTER
Please set him up to see Dr. Ryan Lucero as VV  I sent script of prednisone he can start using in the interim  If he wants to try the Symbicort I can send a script in for this as well but the cost apparently was the problem

## 2020-12-04 NOTE — TELEPHONE ENCOUNTER
We called in [address] because she could not afford Symbicort. Feels worse everyday. Please advise. Wants to feel   Better.

## 2020-12-18 ENCOUNTER — VIRTUAL VISIT (OUTPATIENT)
Dept: FAMILY MEDICINE CLINIC | Age: 40
End: 2020-12-18
Payer: COMMERCIAL

## 2020-12-18 PROCEDURE — 99214 OFFICE O/P EST MOD 30 MIN: CPT | Performed by: FAMILY MEDICINE

## 2020-12-18 NOTE — PROGRESS NOTES
2020    TELEHEALTH EVALUATION -- Audio/Visual (During APYKY-51 public health emergency)    HPI:    Serge Mukherjee (:  1980) has requested an audio/video evaluation for the following concern(s):    Had a hx of constant pressure in chest and was seen by Dr. Pedro Gonzales. Patient had spirometry, and had chest xray. Was diagnosed with moderate asthma. Patient is worried about this diagnosis. Symbicort was too expensive, and advair made her worse. She stopped advair 2 weeks ago, and she feels 10 x better. She is having nasal dryness. Has been using nasal saline and his has been helpful. Has not had issues with allergies in the past.  Has not had \"asthma attack\". Review of Systems    Prior to Visit Medications    Medication Sig Taking?  Authorizing Provider   levothyroxine (SYNTHROID) 100 MCG tablet TAKE 1 TABLET BY MOUTH ONE TIME A DAY Yes Tamar Poe MD   budesonide-formoterol [facility]) 160-4.5 MCG/ACT AERO Inhale 2 puffs into the lungs 2 times daily  Patient not taking: Reported on 2020  [id number] [facility] MD Joe   albuterol sulfate HFA (PROAIR HFA) 108 (90 Base) MCG/ACT inhaler Inhale 2 puffs into the lungs every 6 hours as needed for Wheezing  Patient not taking: Reported on 2020  Britany Desai MD       Social History     Tobacco Use    Smoking status: Never Smoker    Smokeless tobacco: Never Used   Substance Use Topics    Alcohol use: Yes     Comment: [id number] or beer     Drug use: No        Allergies   Allergen Reactions    Citalopram      Night terrors   ,   Past Medical History:   Diagnosis Date    Abnormal Pap smear of cervix     Anxiety     Breast disorder     Depression 2016    MIKE (generalized anxiety disorder)     HSV-2 infection     Hypothyroidism     Moderate asthma without complication 2847    Vitamin B12 deficiency    ,   Past Surgical History:   Procedure Laterality Date    WISDOM TOOTH EXTRACTION     ,   Social History     Tobacco Use  Smoking status: Never Smoker    Smokeless tobacco: Never Used   Substance Use Topics    Alcohol use: Yes     Comment: OCC WINE or beer     Drug use: No   ,   Family History   Problem Relation Age of Onset    Depression Mother     Other Mother         thyroid removed    Hypothyroidism Sister     Alcohol Abuse Father     No Known Problems Brother     Breast Cancer Maternal Aunt     No Known Problems Paternal Grandfather     Cancer Paternal Grandmother         stomach then spread    No Known Problems Maternal Grandmother     No Known Problems Maternal Grandfather     No Known Problems Maternal Uncle        PHYSICAL EXAMINATION:  [    Constitutional: [x] Appears well-developed and well-nourished [x] No apparent distress      [] Abnormal-   Mental status  [x] Alert and awake  [x] Oriented to person/place/time []Able to follow commands      Eyes:  EOM    [x]  Normal  [] Abnormal-  Sclera  [x]  Normal  [] Abnormal -         Discharge [x]  None visible  [] Abnormal -    HENT:   [x] Normocephalic, atraumatic.   [] Abnormal   [x] Mouth/Throat: Mucous membranes are moist.     External Ears [x] Normal  [] Abnormal-     Neck: [x] No visualized mass     Pulmonary/Chest: [x] Respiratory effort normal.  [x] No visualized signs of difficulty breathing or respiratory distress        [] Abnormal-      Musculoskeletal:   [x] Normal gait with no signs of ataxia         [x] Normal range of motion of neck        [] Abnormal-       Neurological:        [x] No Facial Asymmetry (Cranial nerve 7 motor function) (limited exam to video visit)          [x] No gaze palsy        [] Abnormal-         Skin:        [x] No significant exanthematous lesions or discoloration noted on facial skin         [] Abnormal-            Psychiatric:       [x] Normal Affect [x] No Hallucinations        [] Abnormal-     Other pertinent observable physical exam findings-     ASSESSMENT/PLAN: 1. Moderate persistent asthma, unspecified whether complicated  Extrinsic vs. Intrinsic. Allergy tessting discussed in detail with patient. Stay off of inhalers for now, unless exacerbated. - External Referral To Allergy        No follow-ups on file. Surya Le is a 36 y.o. female being evaluated by a Virtual Visit (video visit) encounter to address concerns as mentioned above. A caregiver was present when appropriate. Due to this being a TeleHealth encounter ([facility]), evaluation of the following organ systems was limited: Vitals/Constitutional/EENT/Resp/CV/GI//MS/Neuro/Skin/Heme-Lymph-Imm. Pursuant to the emergency declaration under the [address] [place] [address], [address] [place] [address] authority and the [employer] and [employer] Act, this Virtual Visit was conducted with patient's (and/or legal guardian's) consent, to reduce the patient's risk of exposure to COVID-19 and provide necessary medical care. The patient (and/or legal guardian) has also been advised to contact this office for worsening conditions or problems, and seek emergency medical treatment and/or call 911 if deemed necessary. Patient identification was verified at the start of the visit: Yes    Total time spent on this encounter: Not billed by time    Services were provided through a video synchronous discussion virtually to substitute for in-person clinic visit. Patient and provider were located at their individual homes. --Chano Fontanez MD on 12/18/2020 at 9:14 AM    An electronic signature was used to authenticate this note.

## 2021-03-10 ENCOUNTER — OFFICE VISIT (OUTPATIENT)
Dept: FAMILY MEDICINE CLINIC | Age: 41
End: 2021-03-10

## 2021-03-10 VITALS
SYSTOLIC BLOOD PRESSURE: 104 MMHG | TEMPERATURE: 98 F | WEIGHT: 169 LBS | BODY MASS INDEX: 26.53 KG/M2 | OXYGEN SATURATION: 98 % | HEIGHT: 67 IN | DIASTOLIC BLOOD PRESSURE: 64 MMHG | HEART RATE: 76 BPM

## 2021-03-10 DIAGNOSIS — R06.02 SHORTNESS OF BREATH: Primary | ICD-10-CM

## 2021-03-10 PROCEDURE — 99213 OFFICE O/P EST LOW 20 MIN: CPT | Performed by: FAMILY MEDICINE

## 2021-03-10 PROCEDURE — 36415 COLL VENOUS BLD VENIPUNCTURE: CPT | Performed by: FAMILY MEDICINE

## 2021-03-11 LAB
A/G RATIO: 1.7 (ref 1.1–2.2)
ALBUMIN SERPL-MCNC: 4.3 G/DL (ref 3.4–5)
ALP BLD-CCNC: 44 U/L (ref 40–129)
ALT SERPL-CCNC: 13 U/L (ref 10–40)
ANION GAP SERPL CALCULATED.3IONS-SCNC: 10 MMOL/L (ref 3–16)
AST SERPL-CCNC: 13 U/L (ref 15–37)
BASOPHILS ABSOLUTE: 0 K/UL (ref 0–0.2)
BASOPHILS RELATIVE PERCENT: 0.6 %
BILIRUB SERPL-MCNC: <0.2 MG/DL (ref 0–1)
BUN BLDV-MCNC: 13 MG/DL (ref 7–20)
C-REACTIVE PROTEIN: <3 MG/L (ref 0–5.1)
CALCIUM SERPL-MCNC: 9.5 MG/DL (ref 8.3–10.6)
CHLORIDE BLD-SCNC: 103 MMOL/L (ref 99–110)
CO2: 28 MMOL/L (ref 21–32)
CREAT SERPL-MCNC: 0.8 MG/DL (ref 0.6–1.1)
EOSINOPHILS ABSOLUTE: 0.1 K/UL (ref 0–0.6)
EOSINOPHILS RELATIVE PERCENT: 2.3 %
FOLATE: >20 NG/ML (ref 4.78–24.2)
GFR AFRICAN AMERICAN: >60
GFR NON-AFRICAN AMERICAN: >60
GLOBULIN: 2.5 G/DL
GLUCOSE BLD-MCNC: 87 MG/DL (ref 70–99)
HCT VFR BLD CALC: 38.6 % (ref 36–48)
HEMOGLOBIN: 13 G/DL (ref 12–16)
LYMPHOCYTES ABSOLUTE: 1.9 K/UL (ref 1–5.1)
LYMPHOCYTES RELATIVE PERCENT: 36.4 %
MCH RBC QN AUTO: 30.8 PG (ref 26–34)
MCHC RBC AUTO-ENTMCNC: 33.8 G/DL (ref 31–36)
MCV RBC AUTO: 91.3 FL (ref 80–100)
MONOCYTES ABSOLUTE: 0.3 K/UL (ref 0–1.3)
MONOCYTES RELATIVE PERCENT: 5.9 %
NEUTROPHILS ABSOLUTE: 2.9 K/UL (ref 1.7–7.7)
NEUTROPHILS RELATIVE PERCENT: 54.8 %
PDW BLD-RTO: 13.2 % (ref 12.4–15.4)
PLATELET # BLD: 252 K/UL (ref 135–450)
PMV BLD AUTO: 8.4 FL (ref 5–10.5)
POTASSIUM SERPL-SCNC: 4.3 MMOL/L (ref 3.5–5.1)
RBC # BLD: 4.22 M/UL (ref 4–5.2)
SEDIMENTATION RATE, ERYTHROCYTE: 9 MM/HR (ref 0–20)
SODIUM BLD-SCNC: 141 MMOL/L (ref 136–145)
TOTAL PROTEIN: 6.8 G/DL (ref 6.4–8.2)
TSH REFLEX: 2.05 UIU/ML (ref 0.27–4.2)
VITAMIN B-12: 332 PG/ML (ref 211–911)
VITAMIN D 25-HYDROXY: 36.6 NG/ML
WBC # BLD: 5.3 K/UL (ref 4–11)

## 2021-03-22 ASSESSMENT — ENCOUNTER SYMPTOMS
SHORTNESS OF BREATH: 1
ABDOMINAL PAIN: 0

## 2021-03-22 NOTE — PROGRESS NOTES
Ceci Winters (:  1980) is a 39 y.o. female,Established patient, here for evaluation of the following chief complaint(s): Annual Exam      ASSESSMENT/PLAN:  1. Shortness of breath  -     CBC WITH AUTO DIFFERENTIAL  -     TSH with Reflex  -     COMPREHENSIVE METABOLIC PANEL  -     SEDIMENTATION RATE  -     XR CHEST STANDARD (2 VW); Future  -     C-REACTIVE PROTEIN  -     Vitamin B12 & Folate  -     Vitamin D 25 Hydroxy      No follow-ups on file. SUBJECTIVE/OBJECTIVE:  Ceci Winters is a 39 y.o. female. Patient presents with: Annual Exam        The patients PMH, surgical history, family history, medications, allergies were all reviewed and updated as appropriate today. Shortness of Breath  This is a new problem. The current episode started more than 1 month ago. The problem occurs constantly. The problem has been gradually worsening. Pertinent negatives include no abdominal pain, chest pain, claudication, coryza or ear pain. Nothing aggravates the symptoms. The patient has no known risk factors for DVT/PE. She has tried nothing for the symptoms. Review of Systems   HENT: Negative for ear pain. Respiratory: Positive for shortness of breath. Cardiovascular: Negative for chest pain and claudication. Gastrointestinal: Negative for abdominal pain. Physical Exam  Vitals signs and nursing note reviewed. Constitutional:       Appearance: Normal appearance. She is well-developed. HENT:      Head: Normocephalic and atraumatic. Right Ear: External ear normal.      Left Ear: External ear normal.      Nose: Nose normal.   Eyes:      Conjunctiva/sclera: Conjunctivae normal.      Pupils: Pupils are equal, round, and reactive to light. Neck:      Musculoskeletal: Normal range of motion and neck supple. Cardiovascular:      Rate and Rhythm: Normal rate and regular rhythm. Heart sounds: Normal heart sounds.    Pulmonary:      Effort: Pulmonary effort is normal.      Breath sounds: Normal breath sounds. Abdominal:      General: Bowel sounds are normal.      Palpations: Abdomen is soft. Musculoskeletal: Normal range of motion. Skin:     General: Skin is dry. Neurological:      Mental Status: She is alert and oriented to person, place, and time. Deep Tendon Reflexes: Reflexes are normal and symmetric. Psychiatric:         Behavior: Behavior normal.         Thought Content: Thought content normal.         Judgment: Judgment normal.               An electronic signature was used to authenticate this note.     --Cayla Herndon MD

## 2021-03-24 ENCOUNTER — OFFICE VISIT (OUTPATIENT)
Dept: OBGYN CLINIC | Age: 41
End: 2021-03-24

## 2021-03-24 VITALS
HEART RATE: 71 BPM | SYSTOLIC BLOOD PRESSURE: 118 MMHG | WEIGHT: 169.5 LBS | TEMPERATURE: 97.6 F | DIASTOLIC BLOOD PRESSURE: 76 MMHG | BODY MASS INDEX: 26.55 KG/M2

## 2021-03-24 DIAGNOSIS — N92.6 IRREGULAR MENSTRUAL CYCLE: Primary | ICD-10-CM

## 2021-03-24 DIAGNOSIS — E03.9 HYPOTHYROIDISM, UNSPECIFIED TYPE: ICD-10-CM

## 2021-03-24 PROCEDURE — 99212 OFFICE O/P EST SF 10 MIN: CPT | Performed by: OBSTETRICS & GYNECOLOGY

## 2021-03-25 ASSESSMENT — ENCOUNTER SYMPTOMS
ABDOMINAL DISTENTION: 0
ABDOMINAL PAIN: 0
NAUSEA: 0
DIARRHEA: 0
VOMITING: 0
SHORTNESS OF BREATH: 0
CONSTIPATION: 0

## 2021-03-26 NOTE — PROGRESS NOTES
Physical Exam  Vitals signs and nursing note reviewed. Constitutional:       General: She is not in acute distress. Appearance: Normal appearance. She is well-developed. She is not ill-appearing, toxic-appearing or diaphoretic. HENT:      Head: Normocephalic and atraumatic. Neck:      Thyroid: No thyromegaly or thyroid tenderness. Cardiovascular:      Rate and Rhythm: Normal rate and regular rhythm. Heart sounds: Normal heart sounds. Pulmonary:      Effort: Pulmonary effort is normal. No respiratory distress. Breath sounds: No wheezing or rales. Lymphadenopathy:      Cervical: No cervical adenopathy. Skin:     General: Skin is warm and dry. Neurological:      Mental Status: She is alert and oriented to person, place, and time. Psychiatric:         Behavior: Behavior normal.         Thought Content: Thought content normal.         Judgment: Judgment normal.       PELVIC ULTRASOUND with DOPPLER INTERROGATION    NON OB       DATE: 02/26/2020       PHYSICIAN: BAHMAN RAMOS        SONOGRAPHER: ALLY [facility]       INDICATION: abnormal uterine bleeding       TYPE OF SCAN: vaginal, abdominal       FINDINGS:     The cul de sac is normal. No free fluid appreciated. The cervix is normal and not enlarged. Nabothian cyst/s is not noted within the uterine cervix. The uterus measures 4.87 cm x 3.69 cm x 2.79 cm.     The uterus is anteverted. The endometrium measures 0.37 mm. The myometrium is homogeneous in appearance. No uterine anomalies are noted. The right ovary is present and normal.  The right ovary measures 2.18 cm x 1.78 cm x 1.09 cm.     Ovary findings: No masses seen. The right adnexa is normal.   The left ovary is present and normal.  The left ovary measures 1.62 cm x 1.04 cm x 1.23 cm.     Ovary findings: No masses seen. The left adnexa is normal.   Doppler interrogation demonstrates normal blood flow to the left ovary.        IMPRESSION: Normal uterus.  Normal right ovary. Normal left ovary. Normal blood flow seen to right and left ovary. Imaging is limited secondary to bowel gas. The patient is well aware of the limitations of ultrasound in the detection of anomalies of the abdomen and pelvis. Assessment:       Diagnosis Orders   1. Irregular menstrual cycle     2. Hypothyroidism, unspecified type             Plan:      Options for treatment reviewed:  Surveillance  Continue cycling with provera  Consider 6 month course of OCPs  Consider endometrial ablation  Consider hysterectomy  Lab work discussed: [address], LH, PRL, estrogen, testosterone, DHEAS, 17OH progesterone. Will look in to price for patient--patient concerned due to self pay status.   Blood work prn patient approval.   Follow up prn         Ticarlos Arellano, DO

## 2021-04-07 ENCOUNTER — TELEPHONE (OUTPATIENT)
Dept: OBGYN CLINIC | Age: 41
End: 2021-04-07

## 2021-04-07 DIAGNOSIS — N92.6 IRREGULAR MENSTRUAL CYCLE: Primary | ICD-10-CM

## 2021-04-07 DIAGNOSIS — N92.1 IRREGULAR INTERMENSTRUAL BLEEDING: ICD-10-CM

## 2021-04-07 DIAGNOSIS — E03.9 HYPOTHYROIDISM, UNSPECIFIED TYPE: ICD-10-CM

## 2021-04-07 NOTE — TELEPHONE ENCOUNTER
Pt calling to check status of lab cost for test needed.:  Lab work discussed: FSH, LH, PRL, estrogen, testosterone, DHEAS, 17OH progesterone. Will look in to price for patient--patient concerned due to self pay status.

## 2021-04-07 NOTE — TELEPHONE ENCOUNTER
Spoke with lab:   [address] [facility] 68.18  LH 67.92  PRL 71.09  Estrogen 102.48  Testosterone 74.71  DHEAS 81.86  17OH Progesterone 70.28  Total 536.22 This is Estimate only! LM for pt to call the office for the above information.

## 2021-04-09 DIAGNOSIS — E03.9 HYPOTHYROIDISM, UNSPECIFIED TYPE: ICD-10-CM

## 2021-04-09 RX ORDER — LEVOTHYROXINE SODIUM 0.1 MG/1
TABLET ORAL
Qty: 30 TABLET | Refills: 5 | Status: SHIPPED | OUTPATIENT
Start: 2021-04-09 | End: 2021-10-11

## 2021-04-19 ENCOUNTER — HOSPITAL ENCOUNTER (OUTPATIENT)
Age: 41
Discharge: HOME | End: 2021-04-19

## 2021-04-19 ENCOUNTER — HOSPITAL ENCOUNTER (OUTPATIENT)
Dept: GENERAL RADIOLOGY | Age: 41
Discharge: HOME | End: 2021-04-19

## 2021-04-19 DIAGNOSIS — N92.1 IRREGULAR INTERMENSTRUAL BLEEDING: ICD-10-CM

## 2021-04-19 DIAGNOSIS — N92.6 IRREGULAR MENSTRUAL CYCLE: ICD-10-CM

## 2021-04-19 DIAGNOSIS — R06.02 SHORTNESS OF BREATH: ICD-10-CM

## 2021-04-19 LAB
FOLLICLE STIMULATING HORMONE: 71.5 MIU/ML
LUTEINIZING HORMONE: 30.4 MIU/ML
PROLACTIN: 7 NG/ML

## 2021-04-19 PROCEDURE — 36415 COLL VENOUS BLD VENIPUNCTURE: CPT

## 2021-04-19 PROCEDURE — 83001 ASSAY OF GONADOTROPIN (FSH): CPT

## 2021-04-19 PROCEDURE — 82627 DEHYDROEPIANDROSTERONE: CPT

## 2021-04-19 PROCEDURE — 82671 ASSAY OF ESTROGENS: CPT

## 2021-04-19 PROCEDURE — 84403 ASSAY OF TOTAL TESTOSTERONE: CPT

## 2021-04-19 PROCEDURE — 71046 X-RAY EXAM CHEST 2 VIEWS: CPT

## 2021-04-19 PROCEDURE — 83498 ASY HYDROXYPROGESTERONE 17-D: CPT

## 2021-04-19 PROCEDURE — 84146 ASSAY OF PROLACTIN: CPT

## 2021-04-19 PROCEDURE — 83002 ASSAY OF GONADOTROPIN (LH): CPT

## 2021-04-22 LAB
DHEAS (DHEA SULFATE): 91.6 UG/DL (ref 32–240)
TESTOSTERONE TOTAL: <3 NG/DL (ref 20–70)

## 2021-04-23 LAB — 17-OH PROGESTERONE LCMS: 39.83 NG/DL

## 2021-04-30 LAB
ESTRADIOL LEVEL: 15.2 PG/ML
ESTROGEN TOTAL: 43.8 PG/ML
ESTRONE: 28.6 PG/ML

## 2021-06-22 DIAGNOSIS — J45.909 MODERATE ASTHMA WITHOUT COMPLICATION, UNSPECIFIED WHETHER PERSISTENT: ICD-10-CM

## 2021-06-23 RX ORDER — BUDESONIDE AND FORMOTEROL FUMARATE DIHYDRATE 160; 4.5 UG/1; UG/1
AEROSOL RESPIRATORY (INHALATION)
Qty: 10.2 G | Refills: 5 | Status: SHIPPED | OUTPATIENT
Start: 2021-06-23

## 2022-07-21 ENCOUNTER — OFFICE VISIT (OUTPATIENT)
Dept: FAMILY MEDICINE CLINIC | Age: 42
End: 2022-07-21
Payer: COMMERCIAL

## 2022-07-21 VITALS
SYSTOLIC BLOOD PRESSURE: 102 MMHG | HEART RATE: 78 BPM | HEIGHT: 67 IN | DIASTOLIC BLOOD PRESSURE: 66 MMHG | OXYGEN SATURATION: 98 % | BODY MASS INDEX: 24.8 KG/M2 | WEIGHT: 158 LBS

## 2022-07-21 DIAGNOSIS — E03.9 HYPOTHYROIDISM, UNSPECIFIED TYPE: ICD-10-CM

## 2022-07-21 DIAGNOSIS — Z13.220 SCREENING FOR CHOLESTEROL LEVEL: ICD-10-CM

## 2022-07-21 DIAGNOSIS — Z13.1 SCREENING FOR DIABETES MELLITUS: ICD-10-CM

## 2022-07-21 DIAGNOSIS — N92.6 IRREGULAR PERIODS: ICD-10-CM

## 2022-07-21 DIAGNOSIS — J45.40 MODERATE PERSISTENT ASTHMA, UNSPECIFIED WHETHER COMPLICATED: ICD-10-CM

## 2022-07-21 DIAGNOSIS — Z00.00 ENCOUNTER FOR WELL ADULT EXAM WITHOUT ABNORMAL FINDINGS: ICD-10-CM

## 2022-07-21 DIAGNOSIS — Z00.00 ENCOUNTER FOR WELL ADULT EXAM WITHOUT ABNORMAL FINDINGS: Primary | ICD-10-CM

## 2022-07-21 LAB
A/G RATIO: 1.9 (ref 1.1–2.2)
ALBUMIN SERPL-MCNC: 4.6 G/DL (ref 3.4–5)
ALP BLD-CCNC: 52 U/L (ref 40–129)
ALT SERPL-CCNC: 11 U/L (ref 10–40)
ANION GAP SERPL CALCULATED.3IONS-SCNC: 13 MMOL/L (ref 3–16)
AST SERPL-CCNC: 14 U/L (ref 15–37)
BILIRUB SERPL-MCNC: 0.3 MG/DL (ref 0–1)
BUN BLDV-MCNC: 15 MG/DL (ref 7–20)
CALCIUM SERPL-MCNC: 9.7 MG/DL (ref 8.3–10.6)
CHLORIDE BLD-SCNC: 103 MMOL/L (ref 99–110)
CHOLESTEROL, FASTING: 185 MG/DL (ref 0–199)
CO2: 26 MMOL/L (ref 21–32)
CREAT SERPL-MCNC: 1.1 MG/DL (ref 0.6–1.1)
FOLLICLE STIMULATING HORMONE: 50.7 MIU/ML
GFR AFRICAN AMERICAN: >60
GFR NON-AFRICAN AMERICAN: 54
GLUCOSE BLD-MCNC: 87 MG/DL (ref 70–99)
HDLC SERPL-MCNC: 69 MG/DL (ref 40–60)
LDL CHOLESTEROL CALCULATED: 104 MG/DL
LUTEINIZING HORMONE: 15.8 MIU/ML
POTASSIUM SERPL-SCNC: 4.2 MMOL/L (ref 3.5–5.1)
SODIUM BLD-SCNC: 142 MMOL/L (ref 136–145)
TOTAL PROTEIN: 7 G/DL (ref 6.4–8.2)
TRIGLYCERIDE, FASTING: 62 MG/DL (ref 0–150)
TSH REFLEX: 3.06 UIU/ML (ref 0.27–4.2)
VLDLC SERPL CALC-MCNC: 12 MG/DL

## 2022-07-21 PROCEDURE — 99396 PREV VISIT EST AGE 40-64: CPT | Performed by: FAMILY MEDICINE

## 2022-07-21 ASSESSMENT — PATIENT HEALTH QUESTIONNAIRE - PHQ9
5. POOR APPETITE OR OVEREATING: 1
SUM OF ALL RESPONSES TO PHQ QUESTIONS 1-9: 10
8. MOVING OR SPEAKING SO SLOWLY THAT OTHER PEOPLE COULD HAVE NOTICED. OR THE OPPOSITE, BEING SO FIGETY OR RESTLESS THAT YOU HAVE BEEN MOVING AROUND A LOT MORE THAN USUAL: 0
4. FEELING TIRED OR HAVING LITTLE ENERGY: 2
10. IF YOU CHECKED OFF ANY PROBLEMS, HOW DIFFICULT HAVE THESE PROBLEMS MADE IT FOR YOU TO DO YOUR WORK, TAKE CARE OF THINGS AT HOME, OR GET ALONG WITH OTHER PEOPLE: 0
SUM OF ALL RESPONSES TO PHQ QUESTIONS 1-9: 10
SUM OF ALL RESPONSES TO PHQ9 QUESTIONS 1 & 2: 2
6. FEELING BAD ABOUT YOURSELF - OR THAT YOU ARE A FAILURE OR HAVE LET YOURSELF OR YOUR FAMILY DOWN: 1
2. FEELING DOWN, DEPRESSED OR HOPELESS: 1
1. LITTLE INTEREST OR PLEASURE IN DOING THINGS: 1
7. TROUBLE CONCENTRATING ON THINGS, SUCH AS READING THE NEWSPAPER OR WATCHING TELEVISION: 1
3. TROUBLE FALLING OR STAYING ASLEEP: 3
9. THOUGHTS THAT YOU WOULD BE BETTER OFF DEAD, OR OF HURTING YOURSELF: 0

## 2022-07-21 ASSESSMENT — ENCOUNTER SYMPTOMS
COLOR CHANGE: 0
RHINORRHEA: 0
CHEST TIGHTNESS: 0
TROUBLE SWALLOWING: 0
SHORTNESS OF BREATH: 0
DIARRHEA: 0
BACK PAIN: 0
CONSTIPATION: 0
EYE PAIN: 0

## 2022-07-21 NOTE — PROGRESS NOTES
Well Adult Note  Name: Concetta Dejesus Date: 2022   MRN: [id number] Sex: Female   Age: 43 y.o. Ethnicity: Non- / Non    : 1980 Race: White (non-)      Levar Triplett is here for well adult exam.  History: This is a 51-year-old female who presents for annual exam.  Patient has been also having anxiety recently. She has denies any other significant complaints at this time. Review of Systems   Constitutional:  Negative for fatigue and unexpected weight change. HENT:  Negative for congestion, rhinorrhea and trouble swallowing. Eyes:  Negative for pain and visual disturbance. Respiratory:  Negative for chest tightness and shortness of breath. Cardiovascular:  Negative for chest pain and palpitations. Gastrointestinal:  Negative for constipation and diarrhea. Endocrine: Negative for cold intolerance and heat intolerance. Genitourinary:  Negative for frequency and urgency. Musculoskeletal:  Negative for arthralgias and back pain. Skin:  Negative for color change and rash. Allergic/Immunologic: Negative for environmental allergies and food allergies. Neurological:  Negative for dizziness and light-headedness. Hematological:  Negative for adenopathy. Does not bruise/bleed easily. Psychiatric/Behavioral:  Negative for dysphoric mood and sleep disturbance. Allergies   Allergen Reactions    Citalopram      Night terrors         Prior to Visit Medications    Medication Sig Taking?  Authorizing Provider   levothyroxine (SYNTHROID) 100 MCG tablet TAKE 1 TABLET BY MOUTH EVERY DAY  Yes Birdie Thmoas MD   SYMBICORT 160-4.5 MCG/ACT AERO INHALE 2 PUFFS BY MOUTH TWO TIMES A DAY   Patient not taking: Reported on 2022  Ting Gomez MD   albuterol sulfate HFA (PROAIR HFA) 108 (90 Base) MCG/ACT inhaler Inhale 2 puffs into the lungs every 6 hours as needed for Wheezing  Patient not taking: Reported on 2020  Ting Gomez MD         Past Medical History:   Diagnosis Date    Abnormal Pap smear of cervix     Anxiety     Breast disorder     Depression 6/24/2016    MIKE (generalized anxiety disorder)     HSV-2 infection     Hypothyroidism     Moderate asthma without complication 06/4/6174    Vitamin B12 deficiency        Past Surgical History:   Procedure Laterality Date    WISDOM TOOTH EXTRACTION           Family History   Problem Relation Age of Onset    Depression Mother     Other Mother         thyroid removed    Hypothyroidism Sister     Alcohol Abuse Father     No Known Problems Brother     Breast Cancer Maternal Aunt     No Known Problems Paternal Grandfather     Cancer Paternal Grandmother         stomach then spread    No Known Problems Maternal Grandmother     No Known Problems Maternal Grandfather     No Known Problems Maternal Uncle        Social History     Tobacco Use    Smoking status: Never    Smokeless tobacco: Never   Vaping Use    Vaping Use: Never used   Substance Use Topics    Alcohol use: Yes     Comment: OCC WINE or beer     Drug use: No     Comment: CBD gummies       Objective   /66   Pulse 78   Ht 5' 7\" (1.702 m)   Wt 158 lb (71.7 kg)   LMP 07/17/2022   SpO2 98%   BMI 24.75 kg/m²   Wt Readings from Last 3 Encounters:   07/21/22 158 lb (71.7 kg)   03/24/21 169 lb 8 oz (76.9 kg)   03/10/21 169 lb (76.7 kg)     There were no vitals filed for this visit. Physical Exam  Vitals and nursing note reviewed. Constitutional:       Appearance: Normal appearance. She is well-developed. HENT:      Head: Normocephalic and atraumatic. Right Ear: External ear normal.      Left Ear: External ear normal.      Nose: Nose normal.   Eyes:      Conjunctiva/sclera: Conjunctivae normal.      Pupils: Pupils are equal, round, and reactive to light. Cardiovascular:      Rate and Rhythm: Normal rate and regular rhythm. Heart sounds: Normal heart sounds.    Pulmonary:      Effort: Pulmonary effort is normal.      Breath sounds: Normal breath sounds. Abdominal:      General: Bowel sounds are normal.      Palpations: Abdomen is soft. Musculoskeletal:         General: Normal range of motion. Cervical back: Normal range of motion and neck supple. Skin:     General: Skin is dry. Neurological:      Mental Status: She is alert and oriented to person, place, and time. Deep Tendon Reflexes: Reflexes are normal and symmetric. Psychiatric:         Behavior: Behavior normal.         Thought Content: Thought content normal.         Judgment: Judgment normal.         Assessment   Plan   1. Encounter for well adult exam without abnormal findings  -     Comprehensive Metabolic Panel; Future  2. Moderate persistent asthma, unspecified whether complicated  3. Hypothyroidism, unspecified type  -     TSH with Reflex; Future  4. Irregular periods  -     Follicle Stimulating Hormone; Future  -     Luteinizing Hormone; Future  5. Screening for diabetes mellitus  -     Hemoglobin A1C; Future  6. Screening for cholesterol level  -     Lipid, Fasting; Future       Personalized Preventive Plan   Current Health Maintenance Status  Immunization History   Administered Date(s) Administered    Hepatitis B 06/25/2008, 06/26/2009    Tdap (Boostrix, Adacel) 06/25/2008        Health Maintenance   Topic Date Due    COVID-19 Vaccine (1) Never done    Varicella vaccine (1 of 2 - 2-dose childhood series) Never done    Pneumococcal 0-64 years Vaccine (1 - PCV) Never done    Depression Monitoring  Never done    HIV screen  Never done    Hepatitis C screen  Never done    DTaP/Tdap/Td vaccine (2 - Td or Tdap) 06/25/2018    Flu vaccine (1) 09/01/2022    Cervical cancer screen  04/23/2023    Lipids  02/17/2025    Hepatitis A vaccine  Aged Out    Hepatitis B vaccine  Aged Out    Hib vaccine  Aged Out    Meningococcal (ACWY) vaccine  Aged Out     Recommendations for [employer] Due: see orders and patient instructions/AVS.    No follow-ups on file.

## 2022-07-22 LAB
ESTIMATED AVERAGE GLUCOSE: 99.7 MG/DL
HBA1C MFR BLD: 5.1 %

## 2022-08-15 ENCOUNTER — TELEPHONE (OUTPATIENT)
Dept: FAMILY MEDICINE CLINIC | Age: 42
End: 2022-08-15

## 2022-08-15 NOTE — TELEPHONE ENCOUNTER
Received call from Pt stating that she was contacted by [facility] and was informed that they didn't have her insurance information. Pt states that they won't release her results until they receive her insurance information. Pt is requesting results once they are received by us.      Please advise

## 2022-09-09 ENCOUNTER — TELEMEDICINE (OUTPATIENT)
Dept: FAMILY MEDICINE CLINIC | Age: 42
End: 2022-09-09
Payer: COMMERCIAL

## 2022-09-09 DIAGNOSIS — F32.1 CURRENT MODERATE EPISODE OF MAJOR DEPRESSIVE DISORDER, UNSPECIFIED WHETHER RECURRENT (HCC): Primary | ICD-10-CM

## 2022-09-09 PROCEDURE — 99214 OFFICE O/P EST MOD 30 MIN: CPT | Performed by: FAMILY MEDICINE

## 2022-09-09 RX ORDER — DESVENLAFAXINE 25 MG/1
25 TABLET, EXTENDED RELEASE ORAL DAILY
Qty: 30 TABLET | Refills: 5 | Status: SHIPPED | OUTPATIENT
Start: 2022-09-09

## 2022-10-08 DIAGNOSIS — E03.9 HYPOTHYROIDISM, UNSPECIFIED TYPE: ICD-10-CM

## 2022-10-10 RX ORDER — LEVOTHYROXINE SODIUM 0.1 MG/1
TABLET ORAL
Qty: 30 TABLET | Refills: 3 | Status: SHIPPED | OUTPATIENT
Start: 2022-10-10

## 2022-11-14 ENCOUNTER — TELEPHONE (OUTPATIENT)
Dept: FAMILY MEDICINE CLINIC | Age: 42
End: 2022-11-14

## 2022-11-14 NOTE — TELEPHONE ENCOUNTER
Pt is requesting help with Billing issue.   -Pt states [address] insurance does not show 9/9/22 appt was submitted with contractual right off.  -Pt is asking for bill to be resubmitted. -Pt states she will also call [facility]) billing dept to see if they can also help with problem.

## 2022-11-14 NOTE — TELEPHONE ENCOUNTER
11/14 - 415pm - pt sending me her insurance card via email - insurance was not billed at all for this visit

## 2022-11-17 NOTE — TELEPHONE ENCOUNTER
11/17 pt sent insurance card - it was loaded into media and her reg was corrected from 9/9/22 - there was a typo in her member ID. Pt has been contacted via email and billing was also notified to ensure that the bill gets re-processed with the correct member ID #. Told pt to wait about 4 weeks to see if things process thru correctly now.

## 2023-02-08 ENCOUNTER — OFFICE VISIT (OUTPATIENT)
Dept: OBGYN CLINIC | Age: 43
End: 2023-02-08

## 2023-02-08 VITALS
HEART RATE: 94 BPM | SYSTOLIC BLOOD PRESSURE: 105 MMHG | BODY MASS INDEX: 25.56 KG/M2 | DIASTOLIC BLOOD PRESSURE: 70 MMHG | TEMPERATURE: 97.7 F | WEIGHT: 163.2 LBS

## 2023-02-08 DIAGNOSIS — Z01.419 WOMEN'S ANNUAL ROUTINE GYNECOLOGICAL EXAMINATION: Primary | ICD-10-CM

## 2023-02-08 DIAGNOSIS — Z86.19 HISTORY OF HPV INFECTION: ICD-10-CM

## 2023-02-08 DIAGNOSIS — B00.9 HSV-2 INFECTION: ICD-10-CM

## 2023-02-08 DIAGNOSIS — Z12.31 ENCOUNTER FOR SCREENING MAMMOGRAM FOR MALIGNANT NEOPLASM OF BREAST: ICD-10-CM

## 2023-02-08 DIAGNOSIS — N92.6 IRREGULAR MENSES: ICD-10-CM

## 2023-02-08 DIAGNOSIS — Z12.4 PAP SMEAR FOR CERVICAL CANCER SCREENING: ICD-10-CM

## 2023-02-08 DIAGNOSIS — E03.9 HYPOTHYROIDISM, UNSPECIFIED TYPE: ICD-10-CM

## 2023-02-13 DIAGNOSIS — E03.9 HYPOTHYROIDISM, UNSPECIFIED TYPE: ICD-10-CM

## 2023-02-13 RX ORDER — LEVOTHYROXINE SODIUM 0.1 MG/1
TABLET ORAL
Qty: 30 TABLET | Refills: 0 | Status: SHIPPED | OUTPATIENT
Start: 2023-02-13

## 2023-02-19 PROBLEM — N92.6 IRREGULAR MENSES: Status: ACTIVE | Noted: 2023-02-19

## 2023-02-19 RX ORDER — MEDROXYPROGESTERONE ACETATE 10 MG/1
10 TABLET ORAL DAILY
Qty: 5 TABLET | Refills: 5 | Status: SHIPPED | OUTPATIENT
Start: 2023-02-19 | End: 2023-02-24

## 2023-02-19 RX ORDER — FLUOCINOLONE ACETONIDE 0.25 MG/G
OINTMENT TOPICAL
Qty: 30 G | Refills: 1 | Status: SHIPPED | OUTPATIENT
Start: 2023-02-19

## 2023-02-19 ASSESSMENT — ENCOUNTER SYMPTOMS
DIARRHEA: 0
NAUSEA: 0
ABDOMINAL DISTENTION: 0
VOMITING: 0
CONSTIPATION: 0
ABDOMINAL PAIN: 0
SHORTNESS OF BREATH: 0

## 2023-02-19 NOTE — PROGRESS NOTES
Subjective:      Patient ID: Tess Rosas is a 43 y.o. female. HPI  44 y/o  female presents for well woman examination. Last pap smear was 19--normal pap smear. Tested negative for high risk HPV in 2018. No recent history of abnormal pap smear. Menses occur every 4-6 months, light spotting. Menses occurred mid December and end of August.  [facility] 22--very light. Has taken Necon  in the past for menstrual regulation with success (required [facility]ALO Rx--has had difficulty finding medication in pharmacies). Irregular menses has been a chronic issue for patient. Has taken provera periodically in the past to help regulate menses. Has found timing to be very difficult. Has not used provera for a long time. Denies pelvic cramping and pain. Denies vaginal discharge. Patient seen on 19 secondary to HSV outbreak--prior HSV outbreak was . History also significant for chronic vulvar/perineal irritation as well as recurrent vaginitis and vaginal infections. Infections in the past have lead to perirectal and perineal fissures. Has been treated successfully with Synalar, Terazol and Miralax in the past.  History also significant for cryo treatment of genital warts. Review of Systems   Constitutional:  Negative for activity change, appetite change, chills, fatigue, fever and unexpected weight change. Respiratory:  Negative for shortness of breath. Cardiovascular:  Negative for chest pain. Gastrointestinal:  Negative for abdominal distention, abdominal pain, constipation, diarrhea, nausea and vomiting. Endocrine: Negative for cold intolerance and heat intolerance. Genitourinary:  Positive for menstrual problem. Negative for difficulty urinating, dyspareunia, dysuria, frequency, genital sores (vulvar irritation), hematuria, pelvic pain, vaginal bleeding, vaginal discharge and vaginal pain. Skin:  Negative for rash. Neurological:  Negative for headaches. Hematological:  Does not bruise/bleed easily. Objective:   Physical Exam  Vitals and nursing note reviewed. Exam conducted with a chaperone present. Constitutional:       General: She is not in acute distress. Appearance: Normal appearance. She is well-developed. She is not ill-appearing or toxic-appearing. HENT:      Head: Normocephalic and atraumatic. Neck:      Thyroid: No thyromegaly. Trachea: Trachea normal.   Cardiovascular:      Rate and Rhythm: Normal rate and regular rhythm. Heart sounds: Normal heart sounds, S1 normal and S2 normal.   Pulmonary:      Effort: Pulmonary effort is normal. No respiratory distress. Breath sounds: Normal breath sounds. Chest:   Breasts:     Breasts are symmetrical.      Right: No inverted nipple, mass, nipple discharge, skin change or tenderness. Left: No inverted nipple, mass, nipple discharge, skin change or tenderness. Abdominal:      General: Bowel sounds are normal. There is no distension. Palpations: Abdomen is soft. Abdomen is not rigid. There is no mass. Tenderness: There is no abdominal tenderness. There is no guarding or rebound. Genitourinary:     General: Normal vulva. Exam position: Lithotomy position. Labia:         Right: No rash, tenderness, lesion or injury. Left: No rash, tenderness, lesion or injury. Urethra: No prolapse, urethral pain, urethral swelling or urethral lesion. Vagina: No signs of injury. No vaginal discharge, erythema, tenderness or bleeding. Cervix: No cervical motion tenderness, discharge or friability. Uterus: Not tender. Adnexa:         Right: No mass, tenderness or fullness. Left: No mass, tenderness or fullness. Rectum: Normal.   Musculoskeletal:         General: Normal range of motion. Cervical back: Neck supple. Skin:     General: Skin is warm and dry. Findings: No erythema or rash. Nails: There is no clubbing. Neurological:      Mental Status: She is alert and oriented to person, place, and time. Psychiatric:         Mood and Affect: Mood normal.         Speech: Speech normal.         Behavior: Behavior normal. Behavior is cooperative. Thought Content: Thought content normal.         Judgment: Judgment normal.       Assessment:       Diagnosis Orders   1. Women's annual routine gynecological examination  PAP SMEAR      2. Pap smear for cervical cancer screening  PAP SMEAR      3. Hypothyroidism, unspecified type        4. HSV-2 infection        5. History of HPV infection        6. Irregular menses        7.  Encounter for screening mammogram for malignant neoplasm of breast  [facility] DIGITAL SCREEN W OR WO CAD BILATERAL              Plan:      Orders Placed This Encounter   Procedures    ROS DIGITAL SCREEN W OR WO CAD BILATERAL    PAP SMEAR    Human papillomavirus (HPV) DNA probe thin prep high risk    PAP SMEAR     Rx Synalar  Perineal care reviewed--avoid detergents, wipes, etc.   Options reviewed: cycling with Provera, re-trial OCPs, endometrial ablation, hysterectomy, etc.             Pierre Nath DO

## 2023-02-21 RX ORDER — FLUOCINOLONE ACETONIDE 0.25 MG/G
OINTMENT TOPICAL
Qty: 30 G | Refills: 1 | Status: SHIPPED | OUTPATIENT
Start: 2023-02-21

## 2023-02-21 RX ORDER — MEDROXYPROGESTERONE ACETATE 10 MG/1
10 TABLET ORAL DAILY
Qty: 5 TABLET | Refills: 5 | Status: SHIPPED | OUTPATIENT
Start: 2023-02-21 | End: 2023-02-26

## 2023-03-24 DIAGNOSIS — E03.9 HYPOTHYROIDISM, UNSPECIFIED TYPE: ICD-10-CM

## 2023-03-24 RX ORDER — LEVOTHYROXINE SODIUM 0.1 MG/1
TABLET ORAL
Qty: 30 TABLET | Refills: 0 | Status: SHIPPED | OUTPATIENT
Start: 2023-03-24

## 2023-03-24 NOTE — TELEPHONE ENCOUNTER
Last Office Visit  -  9/9/22  Next Office Visit  -  7/25/23    Last Filled  -  2/13/23  Last UDS -    Contract -

## 2023-04-21 DIAGNOSIS — E03.9 HYPOTHYROIDISM, UNSPECIFIED TYPE: ICD-10-CM

## 2023-04-21 RX ORDER — LEVOTHYROXINE SODIUM 0.1 MG/1
TABLET ORAL
Qty: 30 TABLET | Refills: 0 | Status: SHIPPED | OUTPATIENT
Start: 2023-04-21

## 2023-05-08 DIAGNOSIS — B02.9 HERPES ZOSTER WITHOUT COMPLICATION: ICD-10-CM

## 2023-05-09 RX ORDER — ACYCLOVIR 400 MG/1
400 TABLET ORAL 2 TIMES DAILY
Qty: 180 TABLET | Refills: 3 | Status: SHIPPED | OUTPATIENT
Start: 2023-05-09

## 2023-05-29 DIAGNOSIS — E03.9 HYPOTHYROIDISM, UNSPECIFIED TYPE: ICD-10-CM

## 2023-05-30 RX ORDER — LEVOTHYROXINE SODIUM 0.1 MG/1
TABLET ORAL
Qty: 30 TABLET | Refills: 0 | Status: SHIPPED | OUTPATIENT
Start: 2023-05-30

## 2023-06-27 DIAGNOSIS — E03.9 HYPOTHYROIDISM, UNSPECIFIED TYPE: ICD-10-CM

## 2023-06-27 RX ORDER — LEVOTHYROXINE SODIUM 0.1 MG/1
TABLET ORAL
Qty: 30 TABLET | Refills: 0 | Status: SHIPPED | OUTPATIENT
Start: 2023-06-27

## 2023-07-18 DIAGNOSIS — Z77.098 EXPOSURE TO CHEMICAL INHALATION: ICD-10-CM

## 2023-07-18 DIAGNOSIS — J98.01 ACUTE BRONCHOSPASM: ICD-10-CM

## 2023-07-18 RX ORDER — ALBUTEROL SULFATE 90 UG/1
2 AEROSOL, METERED RESPIRATORY (INHALATION) EVERY 6 HOURS PRN
Qty: 1 EACH | Refills: 0 | Status: SHIPPED | OUTPATIENT
Start: 2023-07-18

## 2023-07-18 NOTE — TELEPHONE ENCOUNTER
Pt requesting a refill     albuterol sulfate HFA (PROAIR HFA) 108 (90 Base) MCG/ACT     Pt stated that the poor air quality from the smoke in the air is causing her breathing problems.     [address] [facility] [employer] [facility]

## 2023-07-24 ASSESSMENT — PATIENT HEALTH QUESTIONNAIRE - PHQ9
7. TROUBLE CONCENTRATING ON THINGS, SUCH AS READING THE NEWSPAPER OR WATCHING TELEVISION: 0
SUM OF ALL RESPONSES TO PHQ QUESTIONS 1-9: 0
7. TROUBLE CONCENTRATING ON THINGS, SUCH AS READING THE NEWSPAPER OR WATCHING TELEVISION: NOT AT ALL
9. THOUGHTS THAT YOU WOULD BE BETTER OFF DEAD, OR OF HURTING YOURSELF: NOT AT ALL
8. MOVING OR SPEAKING SO SLOWLY THAT OTHER PEOPLE COULD HAVE NOTICED. OR THE OPPOSITE, BEING SO FIGETY OR RESTLESS THAT YOU HAVE BEEN MOVING AROUND A LOT MORE THAN USUAL: 0
4. FEELING TIRED OR HAVING LITTLE ENERGY: NOT AT ALL
SUM OF ALL RESPONSES TO PHQ QUESTIONS 1-9: 0
6. FEELING BAD ABOUT YOURSELF - OR THAT YOU ARE A FAILURE OR HAVE LET YOURSELF OR YOUR FAMILY DOWN: 0
5. POOR APPETITE OR OVEREATING: NOT AT ALL
SUM OF ALL RESPONSES TO PHQ QUESTIONS 1-9: 0
8. MOVING OR SPEAKING SO SLOWLY THAT OTHER PEOPLE COULD HAVE NOTICED. OR THE OPPOSITE - BEING SO FIDGETY OR RESTLESS THAT YOU HAVE BEEN MOVING AROUND A LOT MORE THAN USUAL: NOT AT ALL
SUM OF ALL RESPONSES TO PHQ QUESTIONS 1-9: 0
5. POOR APPETITE OR OVEREATING: 0
10. IF YOU CHECKED OFF ANY PROBLEMS, HOW DIFFICULT HAVE THESE PROBLEMS MADE IT FOR YOU TO DO YOUR WORK, TAKE CARE OF THINGS AT HOME, OR GET ALONG WITH OTHER PEOPLE: 0
SUM OF ALL RESPONSES TO PHQ9 QUESTIONS 1 & 2: 0
SUM OF ALL RESPONSES TO PHQ QUESTIONS 1-9: 0
2. FEELING DOWN, DEPRESSED OR HOPELESS: NOT AT ALL
3. TROUBLE FALLING OR STAYING ASLEEP: NOT AT ALL
6. FEELING BAD ABOUT YOURSELF - OR THAT YOU ARE A FAILURE OR HAVE LET YOURSELF OR YOUR FAMILY DOWN: NOT AT ALL
4. FEELING TIRED OR HAVING LITTLE ENERGY: 0
10. IF YOU CHECKED OFF ANY PROBLEMS, HOW DIFFICULT HAVE THESE PROBLEMS MADE IT FOR YOU TO DO YOUR WORK, TAKE CARE OF THINGS AT HOME, OR GET ALONG WITH OTHER PEOPLE: NOT DIFFICULT AT ALL
2. FEELING DOWN, DEPRESSED OR HOPELESS: 0
1. LITTLE INTEREST OR PLEASURE IN DOING THINGS: 0
1. LITTLE INTEREST OR PLEASURE IN DOING THINGS: NOT AT ALL
9. THOUGHTS THAT YOU WOULD BE BETTER OFF DEAD, OR OF HURTING YOURSELF: 0
3. TROUBLE FALLING OR STAYING ASLEEP: 0

## 2023-07-25 ENCOUNTER — OFFICE VISIT (OUTPATIENT)
Dept: FAMILY MEDICINE CLINIC | Age: 43
End: 2023-07-25
Payer: COMMERCIAL

## 2023-07-25 VITALS
HEIGHT: 67 IN | HEART RATE: 76 BPM | BODY MASS INDEX: 26.53 KG/M2 | WEIGHT: 169 LBS | SYSTOLIC BLOOD PRESSURE: 112 MMHG | OXYGEN SATURATION: 98 % | DIASTOLIC BLOOD PRESSURE: 70 MMHG

## 2023-07-25 DIAGNOSIS — Z13.1 SCREENING FOR DIABETES MELLITUS: ICD-10-CM

## 2023-07-25 DIAGNOSIS — Z13.220 SCREENING FOR CHOLESTEROL LEVEL: ICD-10-CM

## 2023-07-25 DIAGNOSIS — E03.9 HYPOTHYROIDISM, UNSPECIFIED TYPE: ICD-10-CM

## 2023-07-25 DIAGNOSIS — Z00.00 ENCOUNTER FOR WELL ADULT EXAM WITHOUT ABNORMAL FINDINGS: Primary | ICD-10-CM

## 2023-07-25 DIAGNOSIS — Z00.00 ENCOUNTER FOR WELL ADULT EXAM WITHOUT ABNORMAL FINDINGS: ICD-10-CM

## 2023-07-25 LAB
ALBUMIN SERPL-MCNC: 4.6 G/DL (ref 3.4–5)
ALBUMIN/GLOB SERPL: 1.8 {RATIO} (ref 1.1–2.2)
ALP SERPL-CCNC: 62 U/L (ref 40–129)
ALT SERPL-CCNC: 14 U/L (ref 10–40)
ANION GAP SERPL CALCULATED.3IONS-SCNC: 10 MMOL/L (ref 3–16)
AST SERPL-CCNC: 16 U/L (ref 15–37)
BILIRUB SERPL-MCNC: 0.3 MG/DL (ref 0–1)
BUN SERPL-MCNC: 15 MG/DL (ref 7–20)
CALCIUM SERPL-MCNC: 10.1 MG/DL (ref 8.3–10.6)
CHLORIDE SERPL-SCNC: 105 MMOL/L (ref 99–110)
CHOLEST SERPL-MCNC: 193 MG/DL (ref 0–199)
CO2 SERPL-SCNC: 28 MMOL/L (ref 21–32)
CREAT SERPL-MCNC: 1 MG/DL (ref 0.6–1.1)
GFR SERPLBLD CREATININE-BSD FMLA CKD-EPI: >60 ML/MIN/{1.73_M2}
GLUCOSE SERPL-MCNC: 93 MG/DL (ref 70–99)
HDLC SERPL-MCNC: 95 MG/DL (ref 40–60)
LDL CHOLESTEROL CALCULATED: 89 MG/DL
POTASSIUM SERPL-SCNC: 4.8 MMOL/L (ref 3.5–5.1)
PROT SERPL-MCNC: 7.2 G/DL (ref 6.4–8.2)
SODIUM SERPL-SCNC: 143 MMOL/L (ref 136–145)
T4 FREE SERPL-MCNC: 1.5 NG/DL (ref 0.9–1.8)
TRIGL SERPL-MCNC: 44 MG/DL (ref 0–150)
TSH SERPL DL<=0.005 MIU/L-ACNC: 3.79 UIU/ML (ref 0.27–4.2)
VLDLC SERPL CALC-MCNC: 9 MG/DL

## 2023-07-25 PROCEDURE — 99396 PREV VISIT EST AGE 40-64: CPT | Performed by: FAMILY MEDICINE

## 2023-07-25 NOTE — PROGRESS NOTES
Well Adult Note  Name: Louisa Gonzalez Date: 2023   MRN: [id number] Sex: Female   Age: 37 y.o. Ethnicity: Non- / Non    : 1980 Race: White (non-)      Blayne Barbosa is here for well adult exam.  History:  45-year-old female presents for complete physical exam.  She does have a history of hypothyroidism and needs her thyroid numbers rechecked. Review of Systems   Constitutional:  Negative for fatigue and unexpected weight change. HENT:  Negative for congestion, rhinorrhea and trouble swallowing. Eyes:  Negative for pain and visual disturbance. Respiratory:  Negative for chest tightness and shortness of breath. Cardiovascular:  Negative for chest pain and palpitations. Gastrointestinal:  Negative for constipation and diarrhea. Endocrine: Negative for cold intolerance and heat intolerance. Genitourinary:  Negative for frequency and urgency. Musculoskeletal:  Negative for arthralgias and back pain. Skin:  Negative for color change and rash. Allergic/Immunologic: Negative for environmental allergies and food allergies. Neurological:  Negative for dizziness and light-headedness. Hematological:  Negative for adenopathy. Does not bruise/bleed easily. Psychiatric/Behavioral:  Negative for dysphoric mood and sleep disturbance. Allergies   Allergen Reactions    Citalopram      Night terrors         Prior to Visit Medications    Medication Sig Taking?  Authorizing Provider   albuterol sulfate HFA (PROAIR HFA) 108 (90 Base) MCG/ACT inhaler Inhale 2 puffs into the lungs every 6 hours as needed for Wheezing Yes Hetty Lennox, MD   levothyroxine (SYNTHROID) 100 MCG tablet TAKE 1 TABLET BY MOUTH EVERY DAY Yes Hetty Lennox, MD   acyclovir (ZOVIRAX) 400 MG tablet Take 1 tablet by mouth 2 times daily Yes Ollen Opitz, DO         Past Medical History:   Diagnosis Date    Abnormal Pap smear of cervix     Anxiety     Breast disorder     Depression 2016

## 2023-07-26 LAB
EST. AVERAGE GLUCOSE BLD GHB EST-MCNC: 105.4 MG/DL
HBA1C MFR BLD: 5.3 %

## 2023-08-02 DIAGNOSIS — E03.9 HYPOTHYROIDISM, UNSPECIFIED TYPE: ICD-10-CM

## 2023-08-02 RX ORDER — LEVOTHYROXINE SODIUM 0.1 MG/1
TABLET ORAL
Qty: 30 TABLET | Refills: 0 | Status: SHIPPED | OUTPATIENT
Start: 2023-08-02

## 2023-08-06 ASSESSMENT — ENCOUNTER SYMPTOMS
CHEST TIGHTNESS: 0
EYE PAIN: 0
RHINORRHEA: 0
DIARRHEA: 0
COLOR CHANGE: 0
CONSTIPATION: 0
SHORTNESS OF BREATH: 0
BACK PAIN: 0
TROUBLE SWALLOWING: 0

## 2023-08-29 DIAGNOSIS — E03.9 HYPOTHYROIDISM, UNSPECIFIED TYPE: ICD-10-CM

## 2023-08-29 RX ORDER — LEVOTHYROXINE SODIUM 0.1 MG/1
TABLET ORAL
Qty: 30 TABLET | Refills: 0 | Status: SHIPPED | OUTPATIENT
Start: 2023-08-29

## 2023-08-29 NOTE — TELEPHONE ENCOUNTER
Last Office Visit  -  7/25/23  Next Office Visit  -  NA    Last Filled  -  8/2/23  Levothyroxine Sodium Oral Tablet 100 MCG

## 2023-09-27 DIAGNOSIS — E03.9 HYPOTHYROIDISM, UNSPECIFIED TYPE: ICD-10-CM

## 2023-09-27 RX ORDER — LEVOTHYROXINE SODIUM 0.1 MG/1
TABLET ORAL
Qty: 30 TABLET | Refills: 2 | Status: SHIPPED | OUTPATIENT
Start: 2023-09-27

## 2023-09-27 NOTE — TELEPHONE ENCOUNTER
Last Office Visit  -  7/25/23  Next Office Visit  -  NA    Last Filled  -  8/29/23  Levothyroxine Sodium Oral Tablet 100 MCG

## 2023-12-06 DIAGNOSIS — E03.9 HYPOTHYROIDISM, UNSPECIFIED TYPE: ICD-10-CM

## 2023-12-06 RX ORDER — LEVOTHYROXINE SODIUM 0.1 MG/1
100 TABLET ORAL DAILY
Qty: 90 TABLET | Refills: 1 | Status: SHIPPED | OUTPATIENT
Start: 2023-12-06

## 2023-12-06 NOTE — TELEPHONE ENCOUNTER
Last Office Visit  -  7/25/23  Next Office Visit  -  none     Would like for this to be sent as a 90 day supply.

## 2024-02-28 ENCOUNTER — OFFICE VISIT (OUTPATIENT)
Dept: OBGYN CLINIC | Age: 44
End: 2024-02-28
Payer: COMMERCIAL

## 2024-02-28 VITALS
OXYGEN SATURATION: 98 % | TEMPERATURE: 97.9 F | DIASTOLIC BLOOD PRESSURE: 70 MMHG | SYSTOLIC BLOOD PRESSURE: 112 MMHG | WEIGHT: 174.4 LBS | HEIGHT: 67 IN | BODY MASS INDEX: 27.37 KG/M2

## 2024-02-28 DIAGNOSIS — N92.6 IRREGULAR MENSES: ICD-10-CM

## 2024-02-28 DIAGNOSIS — Z12.31 ENCOUNTER FOR SCREENING MAMMOGRAM FOR MALIGNANT NEOPLASM OF BREAST: ICD-10-CM

## 2024-02-28 DIAGNOSIS — N89.8 VAGINAL ODOR: ICD-10-CM

## 2024-02-28 DIAGNOSIS — Z53.8 PAP SMEAR OF CERVIX NOT NEEDED: ICD-10-CM

## 2024-02-28 DIAGNOSIS — E03.9 HYPOTHYROIDISM, UNSPECIFIED TYPE: ICD-10-CM

## 2024-02-28 DIAGNOSIS — Z01.419 WOMEN'S ANNUAL ROUTINE GYNECOLOGICAL EXAMINATION: Primary | ICD-10-CM

## 2024-02-28 DIAGNOSIS — B00.9 HSV-2 INFECTION: ICD-10-CM

## 2024-02-28 DIAGNOSIS — Z86.19 HISTORY OF HPV INFECTION: ICD-10-CM

## 2024-02-28 PROCEDURE — 99396 PREV VISIT EST AGE 40-64: CPT | Performed by: OBSTETRICS & GYNECOLOGY

## 2024-02-28 RX ORDER — DESVENLAFAXINE 25 MG/1
TABLET, EXTENDED RELEASE ORAL
COMMUNITY
Start: 2024-02-11 | End: 2024-03-07 | Stop reason: SDUPTHER

## 2024-02-29 LAB
CANDIDA DNA VAG QL NAA+PROBE: ABNORMAL
G VAGINALIS DNA SPEC QL NAA+PROBE: ABNORMAL
T VAGINALIS DNA VAG QL NAA+PROBE: ABNORMAL

## 2024-03-07 RX ORDER — DESVENLAFAXINE 25 MG/1
25 TABLET, EXTENDED RELEASE ORAL DAILY
Qty: 30 TABLET | Refills: 3 | Status: SHIPPED | OUTPATIENT
Start: 2024-03-07

## 2024-03-07 NOTE — TELEPHONE ENCOUNTER
Pt call requesting RX refill for  desvenlafaxine succinate (PRISTIQ) 25 MG TB24 extended release tablet   Ov 7/25/23  Next PE 7/29/24

## 2024-03-11 ASSESSMENT — ENCOUNTER SYMPTOMS
ABDOMINAL DISTENTION: 0
VOMITING: 0
DIARRHEA: 0
ABDOMINAL PAIN: 0
SHORTNESS OF BREATH: 0
CONSTIPATION: 0
NAUSEA: 0

## 2024-03-11 NOTE — PROGRESS NOTES
friability.      Uterus: Not tender.       Adnexa:         Right: No mass, tenderness or fullness.          Left: No mass, tenderness or fullness.     Musculoskeletal:         General: Normal range of motion.      Cervical back: Neck supple.   Skin:     General: Skin is warm and dry.      Findings: No erythema or rash.      Nails: There is no clubbing.   Neurological:      Mental Status: She is alert and oriented to person, place, and time.   Psychiatric:         Mood and Affect: Mood normal.         Speech: Speech normal.         Behavior: Behavior normal. Behavior is cooperative.         Thought Content: Thought content normal.         Judgment: Judgment normal.         Assessment:       Diagnosis Orders   1. Women's annual routine gynecological examination        2. Pap smear of cervix not needed        3. Encounter for screening mammogram for malignant neoplasm of breast  ROS DIGITAL SCREEN W OR WO CAD BILATERAL      4. Vaginal odor  Vaginal Pathogens Probes *A      5. History of HPV infection        6. Irregular menses        7. Hypothyroidism, unspecified type        8. HSV-2 infection                Plan:      Orders Placed This Encounter   Procedures    ROS DIGITAL SCREEN W OR WO CAD BILATERAL    Vaginal Pathogens Probes *A     Await test results  Options for menstrual regulation reviewed.   Follow up prn and 1 year for well woman examination        Raven Choe DO

## 2024-03-14 ENCOUNTER — HOSPITAL ENCOUNTER (OUTPATIENT)
Dept: WOMENS IMAGING | Age: 44
Discharge: HOME | End: 2024-03-14
Attending: OBSTETRICS & GYNECOLOGY
Payer: COMMERCIAL

## 2024-03-14 VITALS — BODY MASS INDEX: 27 KG/M2 | HEIGHT: 67 IN | WEIGHT: 172 LBS

## 2024-03-14 DIAGNOSIS — Z12.31 ENCOUNTER FOR SCREENING MAMMOGRAM FOR MALIGNANT NEOPLASM OF BREAST: ICD-10-CM

## 2024-03-14 PROCEDURE — 77063 BREAST TOMOSYNTHESIS BI: CPT

## 2024-07-14 DIAGNOSIS — E03.9 HYPOTHYROIDISM, UNSPECIFIED TYPE: ICD-10-CM

## 2024-07-14 RX ORDER — LEVOTHYROXINE SODIUM 0.1 MG/1
100 TABLET ORAL DAILY
Qty: 90 TABLET | Refills: 1 | Status: SHIPPED | OUTPATIENT
Start: 2024-07-14

## 2024-07-22 NOTE — TELEPHONE ENCOUNTER
Last Office Visit  -  7/25/23  Next Office Visit  -  7/29/24    Last Filled  -  3/7/24  Last UDS -    Contract -

## 2024-07-23 RX ORDER — DESVENLAFAXINE 25 MG/1
25 TABLET, EXTENDED RELEASE ORAL DAILY
Qty: 30 TABLET | Refills: 0 | Status: SHIPPED | OUTPATIENT
Start: 2024-07-23

## 2024-07-29 ENCOUNTER — OFFICE VISIT (OUTPATIENT)
Dept: FAMILY MEDICINE CLINIC | Age: 44
End: 2024-07-29
Payer: COMMERCIAL

## 2024-07-29 VITALS
DIASTOLIC BLOOD PRESSURE: 66 MMHG | SYSTOLIC BLOOD PRESSURE: 110 MMHG | BODY MASS INDEX: 27.31 KG/M2 | HEIGHT: 67 IN | OXYGEN SATURATION: 97 % | HEART RATE: 83 BPM | WEIGHT: 174 LBS

## 2024-07-29 DIAGNOSIS — Z13.1 SCREENING FOR DIABETES MELLITUS: ICD-10-CM

## 2024-07-29 DIAGNOSIS — Z00.00 ENCOUNTER FOR WELL ADULT EXAM WITHOUT ABNORMAL FINDINGS: ICD-10-CM

## 2024-07-29 DIAGNOSIS — Z13.220 SCREENING FOR CHOLESTEROL LEVEL: ICD-10-CM

## 2024-07-29 DIAGNOSIS — E03.9 HYPOTHYROIDISM, UNSPECIFIED TYPE: ICD-10-CM

## 2024-07-29 DIAGNOSIS — Z00.00 ENCOUNTER FOR WELL ADULT EXAM WITHOUT ABNORMAL FINDINGS: Primary | ICD-10-CM

## 2024-07-29 LAB
ALBUMIN SERPL-MCNC: 4.7 G/DL (ref 3.4–5)
ALBUMIN/GLOB SERPL: 2 {RATIO} (ref 1.1–2.2)
ALP SERPL-CCNC: 57 U/L (ref 40–129)
ALT SERPL-CCNC: 14 U/L (ref 10–40)
ANION GAP SERPL CALCULATED.3IONS-SCNC: 12 MMOL/L (ref 3–16)
AST SERPL-CCNC: 17 U/L (ref 15–37)
BILIRUB SERPL-MCNC: 0.3 MG/DL (ref 0–1)
BUN SERPL-MCNC: 16 MG/DL (ref 7–20)
CALCIUM SERPL-MCNC: 9.6 MG/DL (ref 8.3–10.6)
CHLORIDE SERPL-SCNC: 102 MMOL/L (ref 99–110)
CHOLEST SERPL-MCNC: 175 MG/DL (ref 0–199)
CO2 SERPL-SCNC: 28 MMOL/L (ref 21–32)
CREAT SERPL-MCNC: 0.9 MG/DL (ref 0.6–1.1)
GFR SERPLBLD CREATININE-BSD FMLA CKD-EPI: 81 ML/MIN/{1.73_M2}
GLUCOSE SERPL-MCNC: 90 MG/DL (ref 70–99)
HDLC SERPL-MCNC: 78 MG/DL (ref 40–60)
LDL CHOLESTEROL: 87 MG/DL
POTASSIUM SERPL-SCNC: 4 MMOL/L (ref 3.5–5.1)
PROT SERPL-MCNC: 7.1 G/DL (ref 6.4–8.2)
SODIUM SERPL-SCNC: 142 MMOL/L (ref 136–145)
T4 FREE SERPL-MCNC: 1.8 NG/DL (ref 0.9–1.8)
TRIGL SERPL-MCNC: 48 MG/DL (ref 0–150)
TSH SERPL DL<=0.005 MIU/L-ACNC: 1.06 UIU/ML (ref 0.27–4.2)
VLDLC SERPL CALC-MCNC: 10 MG/DL

## 2024-07-29 PROCEDURE — 99396 PREV VISIT EST AGE 40-64: CPT | Performed by: FAMILY MEDICINE

## 2024-07-29 SDOH — ECONOMIC STABILITY: HOUSING INSECURITY
IN THE LAST 12 MONTHS, WAS THERE A TIME WHEN YOU DID NOT HAVE A STEADY PLACE TO SLEEP OR SLEPT IN A SHELTER (INCLUDING NOW)?: NO

## 2024-07-29 SDOH — ECONOMIC STABILITY: FOOD INSECURITY: WITHIN THE PAST 12 MONTHS, THE FOOD YOU BOUGHT JUST DIDN'T LAST AND YOU DIDN'T HAVE MONEY TO GET MORE.: NEVER TRUE

## 2024-07-29 SDOH — ECONOMIC STABILITY: FOOD INSECURITY: WITHIN THE PAST 12 MONTHS, YOU WORRIED THAT YOUR FOOD WOULD RUN OUT BEFORE YOU GOT MONEY TO BUY MORE.: NEVER TRUE

## 2024-07-29 SDOH — ECONOMIC STABILITY: INCOME INSECURITY: HOW HARD IS IT FOR YOU TO PAY FOR THE VERY BASICS LIKE FOOD, HOUSING, MEDICAL CARE, AND HEATING?: NOT HARD AT ALL

## 2024-07-29 ASSESSMENT — ENCOUNTER SYMPTOMS
DIARRHEA: 0
EYE PAIN: 0
CONSTIPATION: 0
COLOR CHANGE: 0
SHORTNESS OF BREATH: 0
CHEST TIGHTNESS: 0
TROUBLE SWALLOWING: 0
BACK PAIN: 0
RHINORRHEA: 0

## 2024-07-29 ASSESSMENT — PATIENT HEALTH QUESTIONNAIRE - PHQ9
5. POOR APPETITE OR OVEREATING: NOT AT ALL
8. MOVING OR SPEAKING SO SLOWLY THAT OTHER PEOPLE COULD HAVE NOTICED. OR THE OPPOSITE, BEING SO FIGETY OR RESTLESS THAT YOU HAVE BEEN MOVING AROUND A LOT MORE THAN USUAL: NOT AT ALL
SUM OF ALL RESPONSES TO PHQ9 QUESTIONS 1 & 2: 0
4. FEELING TIRED OR HAVING LITTLE ENERGY: SEVERAL DAYS
SUM OF ALL RESPONSES TO PHQ QUESTIONS 1-9: 1
9. THOUGHTS THAT YOU WOULD BE BETTER OFF DEAD, OR OF HURTING YOURSELF: NOT AT ALL
1. LITTLE INTEREST OR PLEASURE IN DOING THINGS: NOT AT ALL
7. TROUBLE CONCENTRATING ON THINGS, SUCH AS READING THE NEWSPAPER OR WATCHING TELEVISION: NOT AT ALL
6. FEELING BAD ABOUT YOURSELF - OR THAT YOU ARE A FAILURE OR HAVE LET YOURSELF OR YOUR FAMILY DOWN: NOT AT ALL
1. LITTLE INTEREST OR PLEASURE IN DOING THINGS: NOT AT ALL
4. FEELING TIRED OR HAVING LITTLE ENERGY: SEVERAL DAYS
SUM OF ALL RESPONSES TO PHQ QUESTIONS 1-9: 1
SUM OF ALL RESPONSES TO PHQ QUESTIONS 1-9: 1
9. THOUGHTS THAT YOU WOULD BE BETTER OFF DEAD, OR OF HURTING YOURSELF: NOT AT ALL
3. TROUBLE FALLING OR STAYING ASLEEP: NOT AT ALL
10. IF YOU CHECKED OFF ANY PROBLEMS, HOW DIFFICULT HAVE THESE PROBLEMS MADE IT FOR YOU TO DO YOUR WORK, TAKE CARE OF THINGS AT HOME, OR GET ALONG WITH OTHER PEOPLE: NOT DIFFICULT AT ALL
10. IF YOU CHECKED OFF ANY PROBLEMS, HOW DIFFICULT HAVE THESE PROBLEMS MADE IT FOR YOU TO DO YOUR WORK, TAKE CARE OF THINGS AT HOME, OR GET ALONG WITH OTHER PEOPLE: NOT DIFFICULT AT ALL
6. FEELING BAD ABOUT YOURSELF - OR THAT YOU ARE A FAILURE OR HAVE LET YOURSELF OR YOUR FAMILY DOWN: NOT AT ALL
SUM OF ALL RESPONSES TO PHQ QUESTIONS 1-9: 1
7. TROUBLE CONCENTRATING ON THINGS, SUCH AS READING THE NEWSPAPER OR WATCHING TELEVISION: NOT AT ALL
SUM OF ALL RESPONSES TO PHQ QUESTIONS 1-9: 1
5. POOR APPETITE OR OVEREATING: NOT AT ALL
8. MOVING OR SPEAKING SO SLOWLY THAT OTHER PEOPLE COULD HAVE NOTICED. OR THE OPPOSITE - BEING SO FIDGETY OR RESTLESS THAT YOU HAVE BEEN MOVING AROUND A LOT MORE THAN USUAL: NOT AT ALL
2. FEELING DOWN, DEPRESSED OR HOPELESS: NOT AT ALL
2. FEELING DOWN, DEPRESSED OR HOPELESS: NOT AT ALL
3. TROUBLE FALLING OR STAYING ASLEEP: NOT AT ALL

## 2024-07-29 NOTE — PROGRESS NOTES
Well Adult Note  Name: Ortiz Jimenez Today’s Date: 2024   MRN: [id number] Sex: Female   Age: 44 y.o. Ethnicity: Non- / Non    : 1980 Race: White (non-)      Ortiz Jimenez is here for a well adult exam.       Subjective   History:  This is a 44-year-old female here for complete physical exam.  She does have a history of hypothyroidism which she needs rechecked today.  She has otherwise been doing well as far as her mood.  Patient has had slight issue with left tennis elbow as well as some left hip tightness.    Review of Systems   Constitutional:  Negative for fatigue and unexpected weight change.   HENT:  Negative for congestion, rhinorrhea and trouble swallowing.    Eyes:  Negative for pain and visual disturbance.   Respiratory:  Negative for chest tightness and shortness of breath.    Cardiovascular:  Negative for chest pain and palpitations.   Gastrointestinal:  Negative for constipation and diarrhea.   Endocrine: Negative for cold intolerance and heat intolerance.   Genitourinary:  Negative for frequency and urgency.   Musculoskeletal:  Negative for arthralgias and back pain.   Skin:  Negative for color change and rash.   Allergic/Immunologic: Negative for environmental allergies and food allergies.   Neurological:  Negative for dizziness and light-headedness.   Hematological:  Negative for adenopathy. Does not bruise/bleed easily.   Psychiatric/Behavioral:  Negative for dysphoric mood and sleep disturbance.        Allergies   Allergen Reactions    Citalopram      Night terrors     Prior to Visit Medications    Medication Sig Taking? Authorizing Provider   desvenlafaxine succinate (PRISTIQ) 25 MG TB24 extended release tablet Take 1 tablet by mouth daily Yes Boom Valdes MD   levothyroxine (SYNTHROID) 100 MCG tablet TAKE 1 TABLET BY MOUTH EVERY DAY Yes Boom Valdes MD   acyclovir (ZOVIRAX) 400 MG tablet Take 1 tablet by mouth 2 times daily Yes Raven Choe,      Past

## 2024-07-30 LAB
EST. AVERAGE GLUCOSE BLD GHB EST-MCNC: 108.3 MG/DL
HBA1C MFR BLD: 5.4 %

## 2024-08-22 RX ORDER — DESVENLAFAXINE 25 MG/1
25 TABLET, EXTENDED RELEASE ORAL DAILY
Qty: 30 TABLET | Refills: 3 | Status: SHIPPED | OUTPATIENT
Start: 2024-08-22

## 2024-12-27 RX ORDER — DESVENLAFAXINE 25 MG/1
25 TABLET, EXTENDED RELEASE ORAL DAILY
Qty: 30 TABLET | Refills: 3 | Status: SHIPPED | OUTPATIENT
Start: 2024-12-27

## 2024-12-27 NOTE — TELEPHONE ENCOUNTER
Last Office Visit  -  7/29/24  Next Office Visit  -  n/a    Last Filled  -  8/22/24  Last UDS -    Contract -

## 2024-12-27 NOTE — TELEPHONE ENCOUNTER
Patient is requesting a rx for desvenlafaxine succinate (PRISTIQ) 25 MG TB24 extended release tablet .    Last seen 07/29/2024  Next visit none

## 2025-01-15 DIAGNOSIS — E03.9 HYPOTHYROIDISM, UNSPECIFIED TYPE: ICD-10-CM

## 2025-01-15 RX ORDER — LEVOTHYROXINE SODIUM 100 UG/1
100 TABLET ORAL DAILY
Qty: 90 TABLET | Refills: 0 | Status: SHIPPED | OUTPATIENT
Start: 2025-01-15

## 2025-01-15 NOTE — TELEPHONE ENCOUNTER
Last Office Visit  -  7/29/24  Next Office Visit  -  n/a    Last Filled  -  7/14/24  Last UDS -    Contract -

## 2025-04-12 DIAGNOSIS — E03.9 HYPOTHYROIDISM, UNSPECIFIED TYPE: ICD-10-CM

## 2025-04-14 RX ORDER — LEVOTHYROXINE SODIUM 100 UG/1
100 TABLET ORAL DAILY
Qty: 90 TABLET | Refills: 0 | Status: SHIPPED | OUTPATIENT
Start: 2025-04-14

## 2025-04-21 RX ORDER — DESVENLAFAXINE 25 MG/1
25 TABLET, EXTENDED RELEASE ORAL DAILY
Qty: 30 TABLET | Refills: 3 | Status: SHIPPED | OUTPATIENT
Start: 2025-04-21

## 2025-07-10 DIAGNOSIS — E03.9 HYPOTHYROIDISM, UNSPECIFIED TYPE: ICD-10-CM

## 2025-07-10 RX ORDER — LEVOTHYROXINE SODIUM 100 UG/1
100 TABLET ORAL DAILY
Qty: 90 TABLET | Refills: 0 | Status: SHIPPED | OUTPATIENT
Start: 2025-07-10

## 2025-07-10 NOTE — TELEPHONE ENCOUNTER
Last Office Visit  -  7/29/24  Next Office Visit  -  7/30/24    Last Filled  -    Last UDS -    Contract -

## 2025-08-04 ENCOUNTER — OFFICE VISIT (OUTPATIENT)
Dept: FAMILY MEDICINE CLINIC | Age: 45
End: 2025-08-04
Payer: COMMERCIAL

## 2025-08-04 VITALS
HEART RATE: 73 BPM | DIASTOLIC BLOOD PRESSURE: 78 MMHG | SYSTOLIC BLOOD PRESSURE: 112 MMHG | WEIGHT: 173 LBS | BODY MASS INDEX: 27.15 KG/M2 | HEIGHT: 67 IN | OXYGEN SATURATION: 96 %

## 2025-08-04 DIAGNOSIS — Z13.1 SCREENING FOR DIABETES MELLITUS: ICD-10-CM

## 2025-08-04 DIAGNOSIS — E03.9 HYPOTHYROIDISM, UNSPECIFIED TYPE: ICD-10-CM

## 2025-08-04 DIAGNOSIS — Z13.220 SCREENING, LIPID: ICD-10-CM

## 2025-08-04 DIAGNOSIS — J31.0 CHRONIC RHINITIS: ICD-10-CM

## 2025-08-04 DIAGNOSIS — Z00.00 ENCOUNTER FOR WELL ADULT EXAM WITHOUT ABNORMAL FINDINGS: Primary | ICD-10-CM

## 2025-08-04 DIAGNOSIS — E53.8 VITAMIN B12 DEFICIENCY: ICD-10-CM

## 2025-08-04 PROCEDURE — 99396 PREV VISIT EST AGE 40-64: CPT | Performed by: FAMILY MEDICINE

## 2025-08-04 PROCEDURE — 99214 OFFICE O/P EST MOD 30 MIN: CPT | Performed by: FAMILY MEDICINE

## 2025-08-04 RX ORDER — PROGESTERONE 100 MG/1
100 CAPSULE ORAL NIGHTLY
COMMUNITY
Start: 2025-06-22

## 2025-08-04 RX ORDER — ESTRADIOL 1 MG/1
1 TABLET ORAL DAILY
COMMUNITY
Start: 2025-06-22

## 2025-08-04 SDOH — ECONOMIC STABILITY: FOOD INSECURITY: WITHIN THE PAST 12 MONTHS, THE FOOD YOU BOUGHT JUST DIDN'T LAST AND YOU DIDN'T HAVE MONEY TO GET MORE.: NEVER TRUE

## 2025-08-04 SDOH — ECONOMIC STABILITY: FOOD INSECURITY: WITHIN THE PAST 12 MONTHS, YOU WORRIED THAT YOUR FOOD WOULD RUN OUT BEFORE YOU GOT MONEY TO BUY MORE.: NEVER TRUE

## 2025-08-04 ASSESSMENT — PATIENT HEALTH QUESTIONNAIRE - PHQ9
8. MOVING OR SPEAKING SO SLOWLY THAT OTHER PEOPLE COULD HAVE NOTICED. OR THE OPPOSITE, BEING SO FIGETY OR RESTLESS THAT YOU HAVE BEEN MOVING AROUND A LOT MORE THAN USUAL: NOT AT ALL
4. FEELING TIRED OR HAVING LITTLE ENERGY: NEARLY EVERY DAY
7. TROUBLE CONCENTRATING ON THINGS, SUCH AS READING THE NEWSPAPER OR WATCHING TELEVISION: NOT AT ALL
2. FEELING DOWN, DEPRESSED OR HOPELESS: NOT AT ALL
SUM OF ALL RESPONSES TO PHQ QUESTIONS 1-9: 3
9. THOUGHTS THAT YOU WOULD BE BETTER OFF DEAD, OR OF HURTING YOURSELF: NOT AT ALL
1. LITTLE INTEREST OR PLEASURE IN DOING THINGS: NOT AT ALL
3. TROUBLE FALLING OR STAYING ASLEEP: NOT AT ALL
6. FEELING BAD ABOUT YOURSELF - OR THAT YOU ARE A FAILURE OR HAVE LET YOURSELF OR YOUR FAMILY DOWN: NOT AT ALL
10. IF YOU CHECKED OFF ANY PROBLEMS, HOW DIFFICULT HAVE THESE PROBLEMS MADE IT FOR YOU TO DO YOUR WORK, TAKE CARE OF THINGS AT HOME, OR GET ALONG WITH OTHER PEOPLE: NOT DIFFICULT AT ALL
5. POOR APPETITE OR OVEREATING: NOT AT ALL
SUM OF ALL RESPONSES TO PHQ QUESTIONS 1-9: 3

## 2025-08-05 DIAGNOSIS — E03.9 HYPOTHYROIDISM, UNSPECIFIED TYPE: ICD-10-CM

## 2025-08-05 DIAGNOSIS — Z00.00 ENCOUNTER FOR WELL ADULT EXAM WITHOUT ABNORMAL FINDINGS: ICD-10-CM

## 2025-08-05 DIAGNOSIS — J31.0 CHRONIC RHINITIS: ICD-10-CM

## 2025-08-05 DIAGNOSIS — E53.8 VITAMIN B12 DEFICIENCY: ICD-10-CM

## 2025-08-05 DIAGNOSIS — Z13.1 SCREENING FOR DIABETES MELLITUS: ICD-10-CM

## 2025-08-05 DIAGNOSIS — Z13.220 SCREENING, LIPID: ICD-10-CM

## 2025-08-05 LAB
ALBUMIN SERPL-MCNC: 4.5 G/DL (ref 3.4–5)
ALBUMIN/GLOB SERPL: 1.7 {RATIO} (ref 1.1–2.2)
ALP SERPL-CCNC: 50 U/L (ref 40–129)
ALT SERPL-CCNC: 18 U/L (ref 10–40)
ANION GAP SERPL CALCULATED.3IONS-SCNC: 11 MMOL/L (ref 3–16)
AST SERPL-CCNC: 20 U/L (ref 15–37)
BILIRUB SERPL-MCNC: 0.4 MG/DL (ref 0–1)
BUN SERPL-MCNC: 19 MG/DL (ref 7–20)
CALCIUM SERPL-MCNC: 9.5 MG/DL (ref 8.3–10.6)
CHLORIDE SERPL-SCNC: 101 MMOL/L (ref 99–110)
CHOLEST SERPL-MCNC: 189 MG/DL (ref 0–199)
CO2 SERPL-SCNC: 27 MMOL/L (ref 21–32)
CREAT SERPL-MCNC: 0.9 MG/DL (ref 0.6–1.1)
EST. AVERAGE GLUCOSE BLD GHB EST-MCNC: 111.2 MG/DL
GFR SERPLBLD CREATININE-BSD FMLA CKD-EPI: 80 ML/MIN/{1.73_M2}
GLUCOSE SERPL-MCNC: 85 MG/DL (ref 70–99)
HBA1C MFR BLD: 5.5 %
HDLC SERPL-MCNC: 81 MG/DL (ref 40–60)
LDL CHOLESTEROL: 98 MG/DL
POTASSIUM SERPL-SCNC: 4.6 MMOL/L (ref 3.5–5.1)
PROT SERPL-MCNC: 7.2 G/DL (ref 6.4–8.2)
SODIUM SERPL-SCNC: 139 MMOL/L (ref 136–145)
T3FREE SERPL-MCNC: 2.2 PG/ML (ref 2.3–4.2)
T4 FREE SERPL-MCNC: 1.5 NG/DL (ref 0.9–1.8)
TRIGL SERPL-MCNC: 50 MG/DL (ref 0–150)
TSH SERPL DL<=0.005 MIU/L-ACNC: 2.06 UIU/ML (ref 0.27–4.2)
VIT B12 SERPL-MCNC: 656 PG/ML (ref 211–911)
VLDLC SERPL CALC-MCNC: 10 MG/DL

## 2025-08-11 LAB
BARLEY IGE QN: <0.1 KU/L (ref 0–0.34)
BEEF IGE QN: <0.1 KU/L (ref 0–0.34)
BELL PEPPER IGE QN: <0.1 KU/L (ref 0–0.34)
CABBAGE IGE QN: <0.1 KU/L (ref 0–0.34)
CARROT IGE QN: <0.1 KU/L (ref 0–0.34)
CHICKEN SERUM PROT IGE QN: <0.1 KU/L (ref 0–0.34)
CODFISH IGE QN: <0.1 KU/L (ref 0–0.34)
CORN IGE QN: <0.1 KU/L (ref 0–0.34)
COW MILK IGE QN: <0.1 KU/L (ref 0–0.34)
CRAB IGE QN: <0.1 KU/L (ref 0–0.34)
EGG WHITE IGE QN: <0.1 KU/L (ref 0–0.34)
GRAPE IGE QN: <0.1 KU/L (ref 0–0.34)
IGE SERPL-ACNC: 22 IU/ML (ref 0–100)
LETTUCE IGE QN: <0.1 KU/L (ref 0–0.34)
OAT IGE QN: <0.1 KU/L (ref 0–0.34)
ORANGE IGE QN: <0.1 KU/L (ref 0–0.34)
PEANUT IGE QN: <0.1 KU/L (ref 0–0.34)
PORK IGE QN: <0.1 KU/L (ref 0–0.34)
POTATO IGE QN: <0.1 KU/L (ref 0–0.34)
RICE IGE QN: <0.1 KU/L (ref 0–0.34)
RYE IGE QN: <0.1 KU/L (ref 0–0.34)
SHRIMP IGE QN: <0.1 KU/L (ref 0–0.34)
SOYBEAN IGE QN: <0.1 KU/L (ref 0–0.34)
TOMATO IGE QN: <0.1 KU/L (ref 0–0.34)
TUNA IGE QN: <0.1 KU/L (ref 0–0.34)
WHEAT IGE QN: <0.1 KU/L (ref 0–0.34)
WHITE BEAN IGE QN: <0.1 KU/L (ref 0–0.34)

## 2025-08-22 RX ORDER — DESVENLAFAXINE 25 MG/1
25 TABLET, EXTENDED RELEASE ORAL DAILY
Qty: 30 TABLET | Refills: 5 | Status: SHIPPED | OUTPATIENT
Start: 2025-08-22